# Patient Record
Sex: FEMALE | Race: WHITE | NOT HISPANIC OR LATINO | Employment: STUDENT | URBAN - METROPOLITAN AREA
[De-identification: names, ages, dates, MRNs, and addresses within clinical notes are randomized per-mention and may not be internally consistent; named-entity substitution may affect disease eponyms.]

---
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
## 2019-04-12 ENCOUNTER — CLINICAL SUPPORT (OUTPATIENT)
Dept: FAMILY MEDICINE CLINIC | Facility: CLINIC | Age: 19
End: 2019-04-12
Payer: COMMERCIAL

## 2019-04-12 DIAGNOSIS — Z23 NEED FOR HEPATITIS B VACCINATION: Primary | ICD-10-CM

## 2019-04-12 PROCEDURE — 90744 HEPB VACC 3 DOSE PED/ADOL IM: CPT

## 2019-04-12 PROCEDURE — 90471 IMMUNIZATION ADMIN: CPT

## 2019-06-12 ENCOUNTER — TELEPHONE (OUTPATIENT)
Dept: FAMILY MEDICINE CLINIC | Facility: CLINIC | Age: 19
End: 2019-06-12

## 2019-06-12 DIAGNOSIS — Z01.84 IMMUNITY TO HEPATITIS B VIRUS DEMONSTRATED BY SEROLOGIC TEST: Primary | ICD-10-CM

## 2019-06-17 ENCOUNTER — APPOINTMENT (OUTPATIENT)
Dept: LAB | Facility: MEDICAL CENTER | Age: 19
End: 2019-06-17
Attending: FAMILY MEDICINE
Payer: COMMERCIAL

## 2019-06-17 DIAGNOSIS — Z01.84 IMMUNITY TO HEPATITIS B VIRUS DEMONSTRATED BY SEROLOGIC TEST: ICD-10-CM

## 2019-06-17 PROCEDURE — 86706 HEP B SURFACE ANTIBODY: CPT

## 2019-06-17 PROCEDURE — 36415 COLL VENOUS BLD VENIPUNCTURE: CPT

## 2019-06-18 ENCOUNTER — TELEPHONE (OUTPATIENT)
Dept: OTHER | Facility: OTHER | Age: 19
End: 2019-06-18

## 2019-06-18 LAB — HBV SURFACE AB SER-ACNC: >1000 MIU/ML

## 2019-06-19 ENCOUNTER — OFFICE VISIT (OUTPATIENT)
Dept: FAMILY MEDICINE CLINIC | Facility: CLINIC | Age: 19
End: 2019-06-19
Payer: COMMERCIAL

## 2019-06-19 VITALS
BODY MASS INDEX: 23.21 KG/M2 | WEIGHT: 131 LBS | OXYGEN SATURATION: 97 % | HEIGHT: 63 IN | HEART RATE: 94 BPM | SYSTOLIC BLOOD PRESSURE: 100 MMHG | DIASTOLIC BLOOD PRESSURE: 50 MMHG | TEMPERATURE: 98 F

## 2019-06-19 DIAGNOSIS — R59.0 LYMPHADENOPATHY, AXILLARY: Primary | ICD-10-CM

## 2019-06-19 PROCEDURE — 99213 OFFICE O/P EST LOW 20 MIN: CPT | Performed by: FAMILY MEDICINE

## 2019-06-19 PROCEDURE — 1036F TOBACCO NON-USER: CPT | Performed by: FAMILY MEDICINE

## 2019-06-19 PROCEDURE — 3008F BODY MASS INDEX DOCD: CPT | Performed by: FAMILY MEDICINE

## 2019-06-19 RX ORDER — CEPHALEXIN 500 MG/1
500 CAPSULE ORAL EVERY 8 HOURS SCHEDULED
Qty: 15 CAPSULE | Refills: 0 | Status: SHIPPED | OUTPATIENT
Start: 2019-06-19 | End: 2019-06-24

## 2019-06-19 RX ORDER — MULTIVITAMIN
1 TABLET ORAL DAILY
COMMUNITY
End: 2021-10-15 | Stop reason: ALTCHOICE

## 2019-06-19 RX ORDER — NORETHINDRONE ACETATE AND ETHINYL ESTRADIOL AND FERROUS FUMARATE 1MG-20(21)
1 KIT ORAL DAILY
Refills: 3 | COMMUNITY
Start: 2019-06-07 | End: 2020-08-12 | Stop reason: CLARIF

## 2019-12-19 ENCOUNTER — OFFICE VISIT (OUTPATIENT)
Dept: URGENT CARE | Facility: MEDICAL CENTER | Age: 19
End: 2019-12-19
Payer: COMMERCIAL

## 2019-12-19 VITALS
HEART RATE: 90 BPM | HEIGHT: 63 IN | BODY MASS INDEX: 23.21 KG/M2 | WEIGHT: 131 LBS | RESPIRATION RATE: 16 BRPM | TEMPERATURE: 97.9 F | SYSTOLIC BLOOD PRESSURE: 104 MMHG | DIASTOLIC BLOOD PRESSURE: 64 MMHG | OXYGEN SATURATION: 98 %

## 2019-12-19 DIAGNOSIS — J00 ACUTE NASOPHARYNGITIS: Primary | ICD-10-CM

## 2019-12-19 PROCEDURE — 99203 OFFICE O/P NEW LOW 30 MIN: CPT | Performed by: PHYSICIAN ASSISTANT

## 2019-12-19 RX ORDER — PREDNISONE 20 MG/1
40 TABLET ORAL DAILY
Qty: 10 TABLET | Refills: 0 | Status: SHIPPED | OUTPATIENT
Start: 2019-12-19 | End: 2019-12-24

## 2019-12-19 RX ORDER — CETIRIZINE HYDROCHLORIDE, PSEUDOEPHEDRINE HYDROCHLORIDE 5; 120 MG/1; MG/1
1 TABLET, FILM COATED, EXTENDED RELEASE ORAL 2 TIMES DAILY
Qty: 30 TABLET | Refills: 0 | Status: SHIPPED | OUTPATIENT
Start: 2019-12-19 | End: 2020-06-18 | Stop reason: ALTCHOICE

## 2019-12-19 RX ORDER — BENZONATATE 100 MG/1
100 CAPSULE ORAL 3 TIMES DAILY PRN
Qty: 15 CAPSULE | Refills: 0 | Status: SHIPPED | OUTPATIENT
Start: 2019-12-19 | End: 2020-06-18 | Stop reason: ALTCHOICE

## 2019-12-19 RX ORDER — FLUTICASONE PROPIONATE 50 MCG
2 SPRAY, SUSPENSION (ML) NASAL DAILY
Qty: 16 G | Refills: 0 | Status: SHIPPED | OUTPATIENT
Start: 2019-12-19 | End: 2021-10-15 | Stop reason: ALTCHOICE

## 2019-12-19 NOTE — PATIENT INSTRUCTIONS
take prednisone as directed until completed   Motrin and/or Tylenol as needed for fevers aches and pains   use additional medications as directed for symptomatic relief as needed   drink plenty of fluids and stay well hydrated  Follow up with PCP in 3-5 days  Proceed to  ER if symptoms worsen  Cold Symptoms   AMBULATORY CARE:   Cold symptoms  include sneezing, dry throat, a stuffy nose, headache, watery eyes, and a cough  Your cough may be dry, or you may cough up mucus  You may also have muscle aches, joint pain, and tiredness  Rarely, you may have a fever  Cold symptoms occur from inflammation in your upper respiratory system caused by a virus  Most colds go away without treatment  Seek care immediately if:   · You have increased tiredness and weakness  · You are unable to eat  · Your heart is beating much faster than usual for you  · You see white spots in the back of your throat and your neck is swollen and sore to the touch  · You see pinpoint or larger reddish-purple dots on your skin  Contact your healthcare provider if:   · You have a fever higher than 102°F (38 9°C)  · You have new or worsening shortness of breath  · You have thick nasal drainage for more than 2 days  · Your symptoms do not improve or get worse within 5 days  · You have questions or concerns about your condition or care  Treatment for cold symptoms  may include NSAIDS to decrease muscle aches and fever  Cold medicines may also be given to decrease coughing, nasal stuffiness, sneezing, and a runny nose  Manage your cold symptoms: The following may help relieve cold symptoms, such as a dry throat and congestion:  · Gargle with mouthwash or warm salt water as directed  · Suck on throat lozenges or hard candy  · Use a cold or warm vaporizer or humidifier to ease your breathing  · Rest for at least 2 days and then as needed to decrease tiredness and weakness       · Use petroleum based jelly around your nostrils to decrease irritation from blowing your nose  · Drink plenty of liquids  Liquids will help thin and loosen thick mucus so you can cough it up  Liquids will also keep you hydrated  Ask your healthcare provider which liquids are best for you and how much to drink each day  Prevent the spread of germs  by washing your hands often  You can spread your cold germs to others for at least 3 days after your symptoms start  Do not share items, such as eating utensils  Cover your nose and mouth when you cough or sneeze using the crook of your elbow instead of your hands  Throw used tissues in the garbage  Do not smoke:  Smoking may worsen your symptoms and increase the length of time you feel sick  Talk with your healthcare provider if you need help to stop smoking  Follow up with your healthcare provider as directed:  Write down your questions so you remember to ask them during your visits  © 2017 [employer]  Information is for End User's use only and may not be sold, redistributed or otherwise used for commercial purposes  All illustrations and images included in CareNotes® are the copyrighted property of [employer]  or [employer]  The above information is an  only  It is not intended as medical advice for individual conditions or treatments  Talk to your doctor, nurse or pharmacist before following any medical regimen to see if it is safe and effective for you

## 2019-12-19 NOTE — PROGRESS NOTES
[facility]        NAME: Kylah Butts is a 20 y o  female  : 2000    MRN: [id number]  DATE: 2019  TIME: 1:19 PM    Assessment and Plan   Acute nasopharyngitis [J00]  1  Acute nasopharyngitis  fluticasone (FLONASE) 50 mcg/act nasal spray    cetirizine-pseudoephedrine (ZyrTEC-D) 5-120 MG per tablet    benzonatate (TESSALON PERLES) 100 mg capsule    predniSONE 20 mg tablet         Patient Instructions      take prednisone as directed until completed   Motrin and/or Tylenol as needed for fevers aches and pains   use additional medications as directed for symptomatic relief as needed   drink plenty of fluids and stay well hydrated  Follow up with PCP in 3-5 days  Proceed to  ER if symptoms worsen  Chief Complaint     Chief Complaint   Patient presents with    Cold Like Symptoms     Patient relates started 4x days ago with cough and sinus congestion  History of Present Illness       42-year-old female presents with runny nose cough congestion sore throat that started 4 days ago  Patient is concerned because 1 of her friends took a sip from her drink and there diagnosed with pneumonia however they very been on antibiotics greater than 24 hours  Denies any fevers or chills  No chest pain shortness of breath  Denies any productive cough  No abdominal pain nausea vomiting or diarrhea  Denies any blurry vision or rashes  Cough   This is a new problem  The current episode started in the past 7 days  The problem has been waxing and waning  The problem occurs constantly  The cough is non-productive  Associated symptoms include nasal congestion, postnasal drip, rhinorrhea and a sore throat  Pertinent negatives include no chills, fever, shortness of breath or wheezing  Nothing aggravates the symptoms  She has tried nothing for the symptoms  The treatment provided no relief  There is no history of asthma  Review of Systems   Review of Systems   Constitutional: Negative  Negative for chills and fever  HENT: Positive for postnasal drip, rhinorrhea and sore throat  Eyes: Negative  Respiratory: Positive for cough  Negative for shortness of breath and wheezing  Cardiovascular: Negative  Gastrointestinal: Negative  Musculoskeletal: Negative  Skin: Negative  Neurological: Negative  Current Medications       Current Outpatient Medications:     JUNEL FE 1/20 1-20 MG-MCG per tablet, Take 1 tablet by mouth daily, Disp: , Rfl: 3    Multiple Vitamin (MULTIVITAMIN) tablet, Take 1 tablet by mouth daily, Disp: , Rfl:     ACZONE 7 5 % GEL, Apply 1 application topically 2 (two) times a day (Patient not taking: Reported on 12/19/2019), Disp: 90 g, Rfl: 1    benzonatate (TESSALON PERLES) 100 mg capsule, Take 1 capsule (100 mg total) by mouth 3 (three) times a day as needed for cough, Disp: 15 capsule, Rfl: 0    cetirizine-pseudoephedrine (ZyrTEC-D) 5-120 MG per tablet, Take 1 tablet by mouth 2 (two) times a day, Disp: 30 tablet, Rfl: 0    EPIDUO FORTE 0 3-2 5 % GEL, Apply 1 application topically 2 (two) times a day (Patient not taking: Reported on 12/19/2019), Disp: 60 g, Rfl: 1    fluticasone (FLONASE) 50 mcg/act nasal spray, 2 sprays into each nostril daily, Disp: 16 g, Rfl: 0    predniSONE 20 mg tablet, Take 2 tablets (40 mg total) by mouth daily for 5 days, Disp: 10 tablet, Rfl: 0    Current Allergies     Allergies as of 12/19/2019 - Reviewed 12/19/2019   Allergen Reaction Noted    Amitriptyline Vomiting 08/11/2015            The following portions of the patient's history were reviewed and updated as appropriate: allergies, current medications, past family history, past medical history, past social history, past surgical history and problem list      Past Medical History:   Diagnosis Date    Changing nevus     last assessed: 4/2/2015    Depression     last assessed: 2/25/2015       History reviewed  No pertinent surgical history      Family History Problem Relation Age of Onset    Diabetes type II Maternal Grandmother         mellitus    Hypothyroidism Maternal Grandmother         hypothyroid    Scleroderma Maternal Grandmother     Hyperlipidemia Paternal Grandmother     Diabetes type II Paternal Grandmother     Anxiety disorder Mother         anxiety and depression    Depression Mother         anxiety and depression    Bipolar disorder Father     Hypothyroidism Sister         hypothyroid    Anxiety disorder Brother         anxiety and depression    Depression Brother         anxiety and depression    Multiple sclerosis Paternal Grandfather          Medications have been verified  Objective   /64   Pulse 90   Temp 97 9 °F (36 6 °C) (Tympanic)   Resp 16   Ht 5' 2 8" (1 595 m)   Wt 59 4 kg (131 lb)   LMP 12/08/2019   SpO2 98%   BMI 23 35 kg/m²        Physical Exam     Physical Exam   Constitutional: She is oriented to person, place, and time  She appears well-developed and well-nourished  No distress  HENT:   Head: Normocephalic and atraumatic  Right Ear: Hearing, tympanic membrane, external ear and ear canal normal    Left Ear: Hearing, tympanic membrane, external ear and ear canal normal    Nose: Nose normal    Mouth/Throat: Uvula is midline, oropharynx is clear and moist and mucous membranes are normal  No oropharyngeal exudate  Eyes: Conjunctivae and EOM are normal  Right eye exhibits no discharge  Left eye exhibits no discharge  Neck: Normal range of motion  Neck supple  Cardiovascular: Normal rate, regular rhythm, normal heart sounds and intact distal pulses  No murmur heard  Pulmonary/Chest: Effort normal and breath sounds normal  No respiratory distress  She has no wheezes  She has no rales  Abdominal: Soft  Bowel sounds are normal  There is no tenderness  Musculoskeletal: Normal range of motion  Lymphadenopathy:     She has no cervical adenopathy     Neurological: She is alert and oriented to person, place, and time  Skin: Skin is warm and dry  Psychiatric: She has a normal mood and affect  Nursing note and vitals reviewed

## 2019-12-30 DIAGNOSIS — L70.0 ACNE VULGARIS: ICD-10-CM

## 2019-12-30 RX ORDER — ADAPALENE AND BENZOYL PEROXIDE 3; 25 MG/G; MG/G
1 GEL TOPICAL 2 TIMES DAILY
Qty: 60 G | Refills: 1 | Status: SHIPPED | OUTPATIENT
Start: 2019-12-30 | End: 2021-10-15 | Stop reason: ALTCHOICE

## 2020-01-31 NOTE — RESULT NOTES
[facility] Care Coordination Contact    CC called and spoke with adult daughter Sergio regarding completion of AVS form. She will need to have the form complete by end of February to avoid disruption in [place] benefit. She can request the form from [facility] if she unable to locate it. CC offered annual visit for assessment. Daughter stated she wants to complete the form first and will reach out to CC when she is ready to schedule.     Dileep Jorge RN Care Coordinator  [facility]  Office: [phone]  Fax: [phone]     Verified Results  (1) CBC/PLT/DIFF [id number] 04:59PM Leslye Cost    Order Number: [id number]     Test Name Result Flag Reference   WBC COUNT 6 05 Thousand/uL  4 31-10 16   RBC COUNT 4 88 Million/uL  3 81-5 12   HEMOGLOBIN 14 8 g/dL  11 5-15 4   HEMATOCRIT 43 7 %  34 8-46  1   MCV 90 fL  82-98   MCH 30 3 pg  26 8-34 3   MCHC 33 9 g/dL  31 4-37 4   RDW 12 7 %  11 6-15 1   MPV 10 5 fL  8 9-12 7   PLATELET COUNT 334 Thousands/uL  149-390   nRBC AUTOMATED 0 /100 WBCs     NEUTROPHILS RELATIVE PERCENT 47 %  43-75   LYMPHOCYTES RELATIVE PERCENT 43 %  14-44   MONOCYTES RELATIVE PERCENT 9 %  4-12   EOSINOPHILS RELATIVE PERCENT 1 %  0-6   BASOPHILS RELATIVE PERCENT 0 %  0-1   NEUTROPHILS ABSOLUTE COUNT 2 84 Thousands/? ??L  1 85-7 62   LYMPHOCYTES ABSOLUTE COUNT 2 59 Thousands/? ??L  0 60-4 47   MONOCYTES ABSOLUTE COUNT 0 53 Thousand/? ??L  0 17-1 22   EOSINOPHILS ABSOLUTE COUNT 0 06 Thousand/? ??L  0 00-0 61   BASOPHILS ABSOLUTE COUNT 0 02 Thousands/? ??L  0 00-0 10     (1) COMPREHENSIVE METABOLIC PANEL [id number] 20:76BW Leslye Cost    Order Number: [id number]     Test Name Result Flag Reference   GLUCOSE,RANDM 82 mg/dL     If the patient is fasting, the ADA then defines impaired fasting glucose as > 100 mg/dL and diabetes as > or equal to 123 mg/dL  SODIUM 138 mmol/L  136-145   POTASSIUM 4 0 mmol/L  3 5-5 3   CHLORIDE 104 mmol/L  100-108   CARBON DIOXIDE 26 mmol/L  21-32   ANION GAP (CALC) 8 mmol/L  4-13   BLOOD UREA NITROGEN 9 mg/dL  5-25   CREATININE 0 70 mg/dL  0 60-1 30   Standardized to IDMS reference method   CALCIUM 9 5 mg/dL  8 3-10 1   BILI, TOTAL 0 44 mg/dL  0 20-1 00   ALK PHOSPHATAS 72 U/L     ALT (SGPT) 16 U/L  12-78   AST(SGOT) 13 U/L  5-45   ALBUMIN 3 9 g/dL  3 5-5 0   TOTAL PROTEIN 7 8 g/dL  6 4-8 2   eGFR      eGFR calculation is only valid for adults 18 years and older  ml/min/1 73sq m   eGFR calculation is only valid for adults 18 years and older       (1) TSH WITH FT4 REFLEX 03Aug2017 04:59PM Curt Polanco    Order Number: [id number]     Test Name Result Flag Reference   TSH 1 100 uIU/mL  0 463-3 980   Patients undergoing fluorescein dye angiography may retain small amounts of fluorescein in the body for 48-72 hours post procedure  Samples containing fluorescein can produce falsely depressed TSH values  If the patient had this procedure,a specimen should be resubmitted post fluorescein clearance  The recommended reference ranges for TSH during pregnancy are as follows:  First trimester 0 1 to 2 5 uIU/mL  Second trimester  0 2 to 3 0 uIU/mL  Third trimester 0 3 to 3 0 uIU/m     (1) VITAMIN D 25-HYDROXY [id number] 04:59PM Curt Polanco    Order Number: [id number]     Test Name Result Flag Reference   VIT D 25-HYDROX 40 0 ng/mL  30 0-100 0   This assay is a certified procedure of the CDC Vitamin D Standardization Certification Program (VDSCP)     Deficiency <20ng/ml   Insufficiency 20-30ng/ml   Sufficient  ng/ml     *Patients undergoing fluorescein dye angiography may retain small amounts of fluorescein in the body for 48-72 hours post procedure  Samples containing fluorescein can produce falsely elevated Vitamin D values  If the patient had this procedure, a specimen should be resubmitted post fluorescein clearance

## 2020-03-25 ENCOUNTER — TELEMEDICINE (OUTPATIENT)
Dept: FAMILY MEDICINE CLINIC | Facility: CLINIC | Age: 20
End: 2020-03-25
Payer: COMMERCIAL

## 2020-03-25 DIAGNOSIS — K52.9 GASTROENTERITIS: Primary | ICD-10-CM

## 2020-03-25 PROCEDURE — 99213 OFFICE O/P EST LOW 20 MIN: CPT | Performed by: FAMILY MEDICINE

## 2020-03-25 RX ORDER — ONDANSETRON 4 MG/1
4 TABLET, ORALLY DISINTEGRATING ORAL EVERY 6 HOURS PRN
Qty: 10 TABLET | Refills: 0 | Status: SHIPPED | OUTPATIENT
Start: 2020-03-25 | End: 2020-06-18 | Stop reason: ALTCHOICE

## 2020-03-25 NOTE — PROGRESS NOTES
Virtual Regular Visit    Problem List Items Addressed This Visit     None      Visit Diagnoses     Gastroenteritis    -  Primary      Symptomatic treatment with brat diet and avoidance of dairy products except she may have yogurt  Called in Zofran for nausea  Call back if not resolving  Relevant Medications    ondansetron (ZOFRAN-ODT) 4 mg disintegrating tablet               Reason for visit is GI    Encounter provider Renita Vela MD    Provider located at [address] [place] [address] Dr [place]agon [id number]      Recent Visits  No visits were found meeting these conditions  Showing recent visits within past 7 days and meeting all other requirements     Future Appointments  No visits were found meeting these conditions  Showing future appointments within next 150 days and meeting all other requirements        After connecting through [employer], the patient was identified by name and date of birth  Big Laurel Zhu was informed that this is a telemedicine visit and that the visit is being conducted through [employer] and patient was informed that this is not a secure, HIPAA-complaint platform  she agrees to proceed  which may not be secure and therefore, might not be HIPAA-compliant  My office door was closed  No one else was in the room  She acknowledged consent and understanding of privacy and security of the video platform  The patient has agreed to participate and understands they can discontinue the visit at any time  Rocael Reynolds is a 20 y o  female   She complains of GI symptoms for the past 4 days  She woke this past Sunday and felt nauseous  Then she had several episodes of vomiting throughout the day  The nausea has continued but vomiting has stopped  Yesterday she had some diarrhea as well  She feels very hungry but is unable to eat because the nausea  She has some mild abdominal pain on the left side    She denies risk of pregnancy  She takes birth control pills and LMP was February 28    she denies recent travel  She denies risk of corona virus exposure  She denies fever, chills or cough  Past Medical History:   Diagnosis Date    Changing nevus     last assessed: 4/2/2015    Depression     last assessed: 2/25/2015       No past surgical history on file  Current Outpatient Medications   Medication Sig Dispense Refill    ACZONE 7 5 % GEL Apply 1 application topically 2 (two) times a day (Patient not taking: Reported on 12/19/2019) 90 g 1    benzonatate (TESSALON PERLES) 100 mg capsule Take 1 capsule (100 mg total) by mouth 3 (three) times a day as needed for cough 15 capsule 0    cetirizine-pseudoephedrine (ZyrTEC-D) 5-120 MG per tablet Take 1 tablet by mouth 2 (two) times a day 30 tablet 0    EPIDUO FORTE 0 3-2 5 % GEL Apply 1 application topically 2 (two) times a day 60 g 1    fluticasone (FLONASE) 50 mcg/act nasal spray 2 sprays into each nostril daily 16 g 0    JUNEL FE 1/20 1-20 MG-MCG per tablet Take 1 tablet by mouth daily  3    Multiple Vitamin (MULTIVITAMIN) tablet Take 1 tablet by mouth daily      ondansetron (ZOFRAN-ODT) 4 mg disintegrating tablet Take 1 tablet (4 mg total) by mouth every 6 (six) hours as needed for nausea or vomiting 10 tablet 0     No current facility-administered medications for this visit  Allergies   Allergen Reactions    Amitriptyline Vomiting     Category: Adverse Reaction; Review of Systems   Constitutional: Positive for activity change  Negative for chills and fever  HENT: Negative for congestion  Respiratory: Negative for cough  Gastrointestinal: Positive for abdominal pain, nausea and vomiting  Negative for abdominal distention  Genitourinary: Negative for difficulty urinating  Neurological: Negative for dizziness and headaches  Physical Exam   Constitutional: She appears well-developed and well-nourished     HENT:   Head: Normocephalic and atraumatic  Abdominal: There is tenderness  She is pointing to left side of the abdomen periumbilical region  Psychiatric: She has a normal mood and affect  Her behavior is normal         I spent 13 minutes with the patient during this visit  This would be [id number] office visit

## 2020-06-18 ENCOUNTER — OFFICE VISIT (OUTPATIENT)
Dept: FAMILY MEDICINE CLINIC | Facility: CLINIC | Age: 20
End: 2020-06-18
Payer: COMMERCIAL

## 2020-06-18 VITALS
DIASTOLIC BLOOD PRESSURE: 60 MMHG | SYSTOLIC BLOOD PRESSURE: 110 MMHG | RESPIRATION RATE: 18 BRPM | HEART RATE: 80 BPM | HEIGHT: 62 IN | BODY MASS INDEX: 21.16 KG/M2 | TEMPERATURE: 98.6 F | WEIGHT: 115 LBS | OXYGEN SATURATION: 100 %

## 2020-06-18 DIAGNOSIS — R11.0 CHRONIC NAUSEA: ICD-10-CM

## 2020-06-18 DIAGNOSIS — Z00.00 ANNUAL PHYSICAL EXAM: Primary | ICD-10-CM

## 2020-06-18 DIAGNOSIS — Z11.4 SCREENING FOR HIV (HUMAN IMMUNODEFICIENCY VIRUS): ICD-10-CM

## 2020-06-18 DIAGNOSIS — R53.83 FATIGUE, UNSPECIFIED TYPE: ICD-10-CM

## 2020-06-18 DIAGNOSIS — Z13.220 SCREENING FOR HYPERLIPIDEMIA: ICD-10-CM

## 2020-06-18 PROBLEM — J02.9 PHARYNGITIS: Status: RESOLVED | Noted: 2018-10-31 | Resolved: 2020-06-18

## 2020-06-18 PROCEDURE — 99395 PREV VISIT EST AGE 18-39: CPT | Performed by: FAMILY MEDICINE

## 2020-06-18 RX ORDER — TRIAMCINOLONE ACETONIDE 1 MG/G
CREAM TOPICAL
COMMUNITY
Start: 2020-05-28 | End: 2021-10-15 | Stop reason: ALTCHOICE

## 2020-06-19 ENCOUNTER — APPOINTMENT (OUTPATIENT)
Dept: LAB | Facility: MEDICAL CENTER | Age: 20
End: 2020-06-19
Attending: FAMILY MEDICINE
Payer: COMMERCIAL

## 2020-06-19 ENCOUNTER — CLINICAL SUPPORT (OUTPATIENT)
Dept: FAMILY MEDICINE CLINIC | Facility: CLINIC | Age: 20
End: 2020-06-19
Payer: COMMERCIAL

## 2020-06-19 VITALS — TEMPERATURE: 99.4 F

## 2020-06-19 DIAGNOSIS — Z11.4 SCREENING FOR HIV (HUMAN IMMUNODEFICIENCY VIRUS): ICD-10-CM

## 2020-06-19 DIAGNOSIS — Z11.1 ENCOUNTER FOR TUBERCULIN SKIN TEST: Primary | ICD-10-CM

## 2020-06-19 DIAGNOSIS — Z11.1 PPD SCREENING TEST: ICD-10-CM

## 2020-06-19 DIAGNOSIS — R53.83 FATIGUE, UNSPECIFIED TYPE: ICD-10-CM

## 2020-06-19 DIAGNOSIS — R11.0 CHRONIC NAUSEA: ICD-10-CM

## 2020-06-19 DIAGNOSIS — Z13.220 SCREENING FOR HYPERLIPIDEMIA: ICD-10-CM

## 2020-06-19 LAB
ALBUMIN SERPL BCP-MCNC: 3.9 G/DL (ref 3.5–5)
ALP SERPL-CCNC: 56 U/L (ref 46–116)
ALT SERPL W P-5'-P-CCNC: 15 U/L (ref 12–78)
ANION GAP SERPL CALCULATED.3IONS-SCNC: 8 MMOL/L (ref 4–13)
AST SERPL W P-5'-P-CCNC: 12 U/L (ref 5–45)
BASOPHILS # BLD AUTO: 0.04 THOUSANDS/ΜL (ref 0–0.1)
BASOPHILS NFR BLD AUTO: 1 % (ref 0–1)
BILIRUB SERPL-MCNC: 0.48 MG/DL (ref 0.2–1)
BUN SERPL-MCNC: 8 MG/DL (ref 5–25)
CALCIUM SERPL-MCNC: 9.2 MG/DL (ref 8.3–10.1)
CHLORIDE SERPL-SCNC: 106 MMOL/L (ref 100–108)
CHOLEST SERPL-MCNC: 206 MG/DL (ref 50–200)
CO2 SERPL-SCNC: 23 MMOL/L (ref 21–32)
CREAT SERPL-MCNC: 0.8 MG/DL (ref 0.6–1.3)
EOSINOPHIL # BLD AUTO: 0.07 THOUSAND/ΜL (ref 0–0.61)
EOSINOPHIL NFR BLD AUTO: 1 % (ref 0–6)
ERYTHROCYTE [DISTWIDTH] IN BLOOD BY AUTOMATED COUNT: 12.9 % (ref 11.6–15.1)
GFR SERPL CREATININE-BSD FRML MDRD: 106 ML/MIN/1.73SQ M
GLUCOSE P FAST SERPL-MCNC: 84 MG/DL (ref 65–99)
HCT VFR BLD AUTO: 43.1 % (ref 34.8–46.1)
HDLC SERPL-MCNC: 58 MG/DL
HGB BLD-MCNC: 14 G/DL (ref 11.5–15.4)
IMM GRANULOCYTES # BLD AUTO: 0.02 THOUSAND/UL (ref 0–0.2)
IMM GRANULOCYTES NFR BLD AUTO: 0 % (ref 0–2)
LDLC SERPL CALC-MCNC: 123 MG/DL (ref 0–100)
LYMPHOCYTES # BLD AUTO: 2.96 THOUSANDS/ΜL (ref 0.6–4.47)
LYMPHOCYTES NFR BLD AUTO: 50 % (ref 14–44)
MCH RBC QN AUTO: 30.4 PG (ref 26.8–34.3)
MCHC RBC AUTO-ENTMCNC: 32.5 G/DL (ref 31.4–37.4)
MCV RBC AUTO: 94 FL (ref 82–98)
MONOCYTES # BLD AUTO: 0.52 THOUSAND/ΜL (ref 0.17–1.22)
MONOCYTES NFR BLD AUTO: 9 % (ref 4–12)
NEUTROPHILS # BLD AUTO: 2.26 THOUSANDS/ΜL (ref 1.85–7.62)
NEUTS SEG NFR BLD AUTO: 39 % (ref 43–75)
NONHDLC SERPL-MCNC: 148 MG/DL
NRBC BLD AUTO-RTO: 0 /100 WBCS
PLATELET # BLD AUTO: 279 THOUSANDS/UL (ref 149–390)
PMV BLD AUTO: 10.9 FL (ref 8.9–12.7)
POTASSIUM SERPL-SCNC: 3.8 MMOL/L (ref 3.5–5.3)
PROT SERPL-MCNC: 7.3 G/DL (ref 6.4–8.2)
RBC # BLD AUTO: 4.61 MILLION/UL (ref 3.81–5.12)
SODIUM SERPL-SCNC: 137 MMOL/L (ref 136–145)
TRIGL SERPL-MCNC: 125 MG/DL
TSH SERPL DL<=0.05 MIU/L-ACNC: 1.49 UIU/ML (ref 0.46–3.98)
WBC # BLD AUTO: 5.87 THOUSAND/UL (ref 4.31–10.16)

## 2020-06-19 PROCEDURE — 86580 TB INTRADERMAL TEST: CPT

## 2020-06-19 PROCEDURE — 36415 COLL VENOUS BLD VENIPUNCTURE: CPT

## 2020-06-19 PROCEDURE — 85025 COMPLETE CBC W/AUTO DIFF WBC: CPT

## 2020-06-19 PROCEDURE — 80061 LIPID PANEL: CPT

## 2020-06-19 PROCEDURE — 80053 COMPREHEN METABOLIC PANEL: CPT

## 2020-06-19 PROCEDURE — 84443 ASSAY THYROID STIM HORMONE: CPT

## 2020-06-19 PROCEDURE — 87389 HIV-1 AG W/HIV-1&-2 AB AG IA: CPT

## 2020-06-22 ENCOUNTER — CLINICAL SUPPORT (OUTPATIENT)
Dept: FAMILY MEDICINE CLINIC | Facility: CLINIC | Age: 20
End: 2020-06-22

## 2020-06-22 DIAGNOSIS — Z11.1 ENCOUNTER FOR PPD SKIN TEST READING: Primary | ICD-10-CM

## 2020-06-22 LAB
HIV 1+2 AB+HIV1 P24 AG SERPL QL IA: NORMAL
INDURATION: 0 MM
TB SKIN TEST: NEGATIVE

## 2020-08-12 ENCOUNTER — OFFICE VISIT (OUTPATIENT)
Dept: FAMILY MEDICINE CLINIC | Facility: CLINIC | Age: 20
End: 2020-08-12
Payer: COMMERCIAL

## 2020-08-12 VITALS
OXYGEN SATURATION: 99 % | HEIGHT: 62 IN | WEIGHT: 114.4 LBS | SYSTOLIC BLOOD PRESSURE: 98 MMHG | HEART RATE: 79 BPM | BODY MASS INDEX: 21.05 KG/M2 | DIASTOLIC BLOOD PRESSURE: 64 MMHG | RESPIRATION RATE: 16 BRPM | TEMPERATURE: 98.2 F

## 2020-08-12 DIAGNOSIS — R21 RASH OF GENITAL AREA: Primary | ICD-10-CM

## 2020-08-12 PROCEDURE — 1036F TOBACCO NON-USER: CPT | Performed by: FAMILY MEDICINE

## 2020-08-12 PROCEDURE — 3008F BODY MASS INDEX DOCD: CPT | Performed by: FAMILY MEDICINE

## 2020-08-12 PROCEDURE — 99214 OFFICE O/P EST MOD 30 MIN: CPT | Performed by: FAMILY MEDICINE

## 2020-08-12 RX ORDER — CLOBETASOL PROPIONATE 0.5 MG/G
0.05 CREAM TOPICAL AS NEEDED
COMMUNITY
End: 2021-10-15 | Stop reason: ALTCHOICE

## 2020-08-12 RX ORDER — NORETHINDRONE ACETATE AND ETHINYL ESTRADIOL 1MG-20(21)
KIT ORAL
COMMUNITY
End: 2022-01-03 | Stop reason: SDUPTHER

## 2020-08-12 NOTE — PROGRESS NOTES
Assessment/Plan:        Rash of genital area  Patient was given new diagnosis of possible lichen sclerosis by her gynecologist yesterday  She and her mom are quite upset because of indications for autoimmune disease, especially since there is a family history of autoimmune disease in her grandmother  I have given her a referral to dermatology as well as rheumatology for further evaluation  I recommended that she should use the Temovate cream to help her symptoms  Diagnoses and all orders for this visit:    Rash of genital area  -     Ambulatory referral to Dermatology; Future  -     Ambulatory referral to Rheumatology; Future    Other orders  -     norethindrone-ethinyl estradiol (Junel FE 1/20) 1-20 MG-MCG per tablet; Junel FE 1/20 (28) 1 mg-20 mcg (21)/75 mg (7) tablet  -     clobetasol (Temovate) 0 05 % cream; 0 05 % by Alternating Nares route as needed          Subjective:      Patient ID: Opal Murphy is a 20 y o  female  She is here with her mom with c/o vaginal pain and itching for over 6 months  She has been on Junel birth control pills and gets periods on a regular basis  She is sexually active with 1 partner  No dyspareunia  She gets random sharp pains in the vaginal region and perineum  These are always worse with menses  She also gets itching which has been helped with steroid cream   She saw Gynecologist in May at [address] [place] [address]  She was given cream which gave a little relief for itching but did not take rash or pain away  Symptoms worsened and she went to gynecologist again yesterday and was diagnosed possible lichen sclerosis and was told this could be autoimmune  She was given prescription for Temovate cream but has not picked it up yet  Her grandmother has scleroderma and possible lichen sclerosis    When rash is present she has dysuria          The following portions of the patient's history were reviewed and updated as appropriate: allergies, current medications, past family history, past medical history, past social history, past surgical history and problem list     Review of Systems   Constitutional: Negative for activity change, chills and fever  HENT: Negative for congestion  Respiratory: Negative for cough  Cardiovascular: Negative for chest pain  Gastrointestinal: Negative for abdominal pain, diarrhea, nausea and vomiting  Genitourinary: Positive for dysuria  Musculoskeletal: Negative for arthralgias and myalgias  Skin: Positive for rash  Neurological: Negative for dizziness and headaches  Hematological: Negative for adenopathy  Objective:      BP 98/64 (BP Location: Left arm, Patient Position: Sitting, Cuff Size: Adult)   Pulse 79   Temp 98 2 °F (36 8 °C) (Temporal)   Resp 16   Ht 5' 2" (1 575 m)   Wt 51 9 kg (114 lb 6 4 oz)   LMP 07/28/2020   SpO2 99%   BMI 20 92 kg/m²          Physical Exam  Vitals signs and nursing note reviewed  Exam conducted with a chaperone present  Constitutional:       Appearance: Normal appearance  HENT:      Head: Normocephalic and atraumatic  Cardiovascular:      Rate and Rhythm: Normal rate and regular rhythm  Pulmonary:      Effort: Pulmonary effort is normal       Breath sounds: Normal breath sounds  Genitourinary:     Comments: Vulval with mild erythema but no rash  No vaginal discharge  Neurological:      Mental Status: She is alert  I have spent 28 minutes with Family today in which greater than 50% of this time was spent in counseling/coordination of care regarding Intructions for management

## 2020-08-12 NOTE — ASSESSMENT & PLAN NOTE
Patient was given new diagnosis of possible lichen sclerosis by her gynecologist yesterday  She and her mom are quite upset because of indications for autoimmune disease, especially since there is a family history of autoimmune disease in her grandmother  I have given her a referral to dermatology as well as rheumatology for further evaluation  I recommended that she should use the Temovate cream to help her symptoms

## 2020-08-17 ENCOUNTER — APPOINTMENT (OUTPATIENT)
Dept: LAB | Facility: MEDICAL CENTER | Age: 20
End: 2020-08-17
Payer: COMMERCIAL

## 2020-08-17 ENCOUNTER — OFFICE VISIT (OUTPATIENT)
Dept: RHEUMATOLOGY | Facility: CLINIC | Age: 20
End: 2020-08-17
Payer: COMMERCIAL

## 2020-08-17 VITALS
WEIGHT: 114 LBS | BODY MASS INDEX: 20.98 KG/M2 | DIASTOLIC BLOOD PRESSURE: 70 MMHG | SYSTOLIC BLOOD PRESSURE: 110 MMHG | HEIGHT: 62 IN | TEMPERATURE: 98.8 F

## 2020-08-17 DIAGNOSIS — R53.83 FATIGUE, UNSPECIFIED TYPE: ICD-10-CM

## 2020-08-17 DIAGNOSIS — R21 RASH OF GENITAL AREA: Primary | ICD-10-CM

## 2020-08-17 LAB
CRP SERPL QL: <3 MG/L
ERYTHROCYTE [SEDIMENTATION RATE] IN BLOOD: 13 MM/HOUR (ref 0–19)

## 2020-08-17 PROCEDURE — 99204 OFFICE O/P NEW MOD 45 MIN: CPT | Performed by: INTERNAL MEDICINE

## 2020-08-17 PROCEDURE — 85652 RBC SED RATE AUTOMATED: CPT | Performed by: INTERNAL MEDICINE

## 2020-08-17 PROCEDURE — 86225 DNA ANTIBODY NATIVE: CPT | Performed by: INTERNAL MEDICINE

## 2020-08-17 PROCEDURE — 3008F BODY MASS INDEX DOCD: CPT | Performed by: FAMILY MEDICINE

## 2020-08-17 PROCEDURE — 36415 COLL VENOUS BLD VENIPUNCTURE: CPT | Performed by: INTERNAL MEDICINE

## 2020-08-17 PROCEDURE — 86038 ANTINUCLEAR ANTIBODIES: CPT | Performed by: INTERNAL MEDICINE

## 2020-08-17 PROCEDURE — 86140 C-REACTIVE PROTEIN: CPT | Performed by: INTERNAL MEDICINE

## 2020-08-17 NOTE — PROGRESS NOTES
Assessment and Plan: Trevor Barnard is a 23 y o   female who presents as a Rheumatology consult referred by Angela Sims MD for evaluation of genital lesions that have been itchy and painful  NIESHA and anti-dsDNA ordered to work-up for lupus and returned negative; ESR/CRP ordered to work-up for any underlying inflammation, which could be seen in Behcet's disease for example, but they returned normal  It does not seem that the patient has a rheumatological disease, but asked patient that in order to get a definite answer about her lesions, she should get a skin biopsy by Dermatology  Asked her to try the clobetasol cream that she was recently prescribed nightly for 6-12 weeks to optimally treat her likely lichen sclerosis  Plan:  Diagnoses and all orders for this visit:    Rash of genital area  -     Ambulatory referral to Rheumatology  -     NIESHA Screen w/ Reflex to Titer/Pattern  -     C-reactive protein  -     Sedimentation rate, automated  -     Anti-DNA antibody, double-stranded  -     clobetasol (TEMOVATE) 0 05 % ointment; Apply topically 2 (two) times a day (Patient not taking: Reported on 11/25/2020)    Fatigue, unspecified type    Follow-up plan: Return to clinic as needed      HPI  Trevor Barnard is a 23 y o   female who presents as a Rheumatology consult referred by Angela Sims MD for evaluation of genital lesions that have been itchy and painful  She went to a gynecologist from May 2020-August 2020, and used triamcinolone ointment morning and night, which took away her itchiness but not pain  She went to another gynecologist last Wednesday and was diagnosed with lichen sclerosis; was prescribed clobetasol cream but didn't start it yet  She went to outside dermatology: [facility], and got treatment for yeast infection, which hasn't helped  She gets a lichen sclerosis flare-up 3-5 times a week   No rashes, not photosensitivity, hands get cold, feet always cold and change colors, no dry symptoms, no skin tightening, gets cankre sores while on period, no alopecia, no SOB/chest pain, no h/o pericarditis or pleurisy, no h/o blood clots or miscarriages  Review of Systems  Review of Systems   Constitutional: Negative for chills, fatigue, fever and unexpected weight change  HENT: Negative for mouth sores and trouble swallowing  Eyes: Negative for pain and visual disturbance  Respiratory: Negative for cough and shortness of breath  Cardiovascular: Negative for chest pain and leg swelling  Gastrointestinal: Negative for abdominal pain, blood in stool, constipation, diarrhea and nausea  Genitourinary: Positive for dysuria  Musculoskeletal: Negative for arthralgias, back pain, joint swelling and myalgias  Skin: Positive for rash  Negative for color change  Neurological: Negative for weakness and numbness  Hematological: Negative for adenopathy  Psychiatric/Behavioral: Negative for sleep disturbance  Allergies  Allergies   Allergen Reactions    Amitriptyline Vomiting     Category:  Adverse Reaction;        Home Medications    Current Outpatient Medications:     clobetasol (Temovate) 0 05 % cream, 0 05 % by Alternating Nares route as needed, Disp: , Rfl:     EPIDUO FORTE 0 3-2 5 % GEL, Apply 1 application topically 2 (two) times a day, Disp: 60 g, Rfl: 1    fluticasone (FLONASE) 50 mcg/act nasal spray, 2 sprays into each nostril daily (Patient not taking: Reported on 11/25/2020), Disp: 16 g, Rfl: 0    Multiple Vitamin (MULTIVITAMIN) tablet, Take 1 tablet by mouth daily, Disp: , Rfl:     norethindrone-ethinyl estradiol (Junel FE 1/20) 1-20 MG-MCG per tablet, Junel FE 1/20 (28) 1 mg-20 mcg (21)/75 mg (7) tablet, Disp: , Rfl:     triamcinolone (KENALOG) 0 1 % cream, APPLY THIN COAT TO AFFECTED AREA TWICE A DAY, Disp: , Rfl:     clobetasol (TEMOVATE) 0 05 % ointment, Apply topically 2 (two) times a day (Patient not taking: Reported on 11/25/2020), Disp: 60 g, Rfl: 3    famotidine (PEPCID) 20 mg tablet, Take 1 tablet (20 mg total) by mouth daily, Disp: 30 tablet, Rfl: 2    Past Medical History  Past Medical History:   Diagnosis Date    Changing nevus     last assessed: 4/2/2015    Depression     last assessed: 2/25/2015   maternal grandmother - scleroderma, Raynaud's syndrome, fibromyalgia  Sister - Hashimoto's thyroiditis  Paternal grandfather - multiple sclerosis    Past Surgical History   History reviewed  No pertinent surgical history  Family History    Family History   Problem Relation Age of Onset    Diabetes type II Maternal Grandmother         mellitus    Hypothyroidism Maternal Grandmother         hypothyroid    Scleroderma Maternal Grandmother     Hyperlipidemia Paternal Grandmother     Diabetes type II Paternal Grandmother     Anxiety disorder Mother         anxiety and depression    Depression Mother         anxiety and depression    Bipolar disorder Father     Hypothyroidism Sister         hypothyroid    Anxiety disorder Brother         anxiety and depression    Depression Brother         anxiety and depression    Multiple sclerosis Paternal Grandfather      No known family history of autoimmune or inflammatory diseases  Social History  Social History     Substance and Sexual Activity   Alcohol Use No     Social History     Substance and Sexual Activity   Drug Use No     Social History     Tobacco Use   Smoking Status Never Smoker   Smokeless Tobacco Never Used       Objective:  Vitals:    08/17/20 1312   BP: 110/70   BP Location: Right arm   Patient Position: Sitting   Cuff Size: Standard   Temp: 98 8 °F (37 1 °C)   TempSrc: Temporal   Weight: 51 7 kg (114 lb)   Height: 5' 2" (1 575 m)       Physical Exam  Constitutional:       General: She is not in acute distress  Appearance: She is well-developed  HENT:      Head: Normocephalic and atraumatic  Eyes:      General: Lids are normal  No scleral icterus       Conjunctiva/sclera: Conjunctivae normal    Neck:      Musculoskeletal: Neck supple  No muscular tenderness  Thyroid: No thyromegaly  Cardiovascular:      Rate and Rhythm: Normal rate and regular rhythm  Heart sounds: S1 normal and S2 normal  No murmur  No friction rub  Pulmonary:      Effort: Pulmonary effort is normal  No tachypnea or respiratory distress  Breath sounds: Normal breath sounds  No wheezing, rhonchi or rales  Musculoskeletal:         General: No tenderness  Lymphadenopathy:      Head:      Right side of head: No submental or submandibular adenopathy  Left side of head: No submental or submandibular adenopathy  Cervical: No cervical adenopathy  Skin:     General: Skin is warm and dry  Findings: Erythema and lesion present  Nails: There is no clubbing  Comments: Genital lesions with surrounding erythema, tender to touch   Neurological:      Mental Status: She is alert  Sensory: No sensory deficit  Psychiatric:         Behavior: Behavior normal  Behavior is cooperative  Reviewed labs      Labs:   Office Visit on 08/17/2020   Component Date Value Ref Range Status    NIESHA 08/17/2020 Negative  Negative Final    CRP 08/17/2020 <3 0  <3 0 mg/L Final    Sed Rate 08/17/2020 13  0 - 19 mm/hour Final    ds DNA Ab 08/17/2020 <1  0 - 9 IU/mL Final                                       Negative      <5                                     Equivocal  5 - 9                                     Positive      >9   Clinical Support on 06/19/2020   Component Date Value Ref Range Status    TB Skin Test 06/22/2020 Negative   Final    Induration 06/22/2020 0  mm Final    Negative   Appointment on 06/19/2020   Component Date Value Ref Range Status    Cholesterol 06/19/2020 206* 50 - 200 mg/dL Final      Cholesterol:       Desirable         <200 mg/dl       Borderline         200-239 mg/dl       High              >239           Triglycerides 06/19/2020 125  <=150 mg/dL Final Triglyceride:     Normal          <150 mg/dl     Borderline High 150-199 mg/dl     High            200-499 mg/dl        Very High       >499 mg/dl    Specimen collection should occur prior to N-Acetylcysteine or Metamizole administration due to the potential for falsely depressed results   HDL, Direct 06/19/2020 58  >=40 mg/dL Final      HDL Cholesterol:       Low     <41 mg/dL  Specimen collection should occur prior to Metamizole administration due to the potential for falsley depressed results   LDL Calculated 06/19/2020 123* 0 - 100 mg/dL Final      LDL Cholesterol:     Optimal           <100 mg/dl     Near Optimal      100-129 mg/dl     Above Optimal       Borderline High 130-159 mg/dl       High            160-189 mg/dl       Very High       >189 mg/dl         This screening LDL is a calculated result  It does not have the accuracy of the Direct Measured LDL in the monitoring of patients with hyperlipidemia and/or statin therapy  Direct Measure LDL (WSN761) must be ordered separately in these patients   Non-HDL-Chol (CHOL-HDL) 06/19/2020 148  mg/dl Final    Sodium 06/19/2020 137  136 - 145 mmol/L Final    Potassium 06/19/2020 3 8  3 5 - 5 3 mmol/L Final    Chloride 06/19/2020 106  100 - 108 mmol/L Final    CO2 06/19/2020 23  21 - 32 mmol/L Final    ANION GAP 06/19/2020 8  4 - 13 mmol/L Final    BUN 06/19/2020 8  5 - 25 mg/dL Final    Creatinine 06/19/2020 0 80  0 60 - 1 30 mg/dL Final    Standardized to IDMS reference method    Glucose, Fasting 06/19/2020 84  65 - 99 mg/dL Final      Specimen collection should occur prior to Sulfasalazine administration due to the potential for falsely depressed results  Specimen collection should occur prior to Sulfapyridine administration due to the potential for falsely elevated results      Calcium 06/19/2020 9 2  8 3 - 10 1 mg/dL Final    AST 06/19/2020 12  5 - 45 U/L Final      Specimen collection should occur prior to Sulfasalazine administration due to the potential for falsely depressed results   ALT 06/19/2020 15  12 - 78 U/L Final      Specimen collection should occur prior to Sulfasalazine and/or Sulfapyridine administration due to the potential for falsely depressed results   Alkaline Phosphatase 06/19/2020 56  46 - 116 U/L Final    Total Protein 06/19/2020 7 3  6 4 - 8 2 g/dL Final    Albumin 06/19/2020 3 9  3 5 - 5 0 g/dL Final    Total Bilirubin 06/19/2020 0 48  0 20 - 1 00 mg/dL Final      Use of this assay is not recommended for patients undergoing treatment with eltrombopag due to the potential for falsely elevated results      eGFR 06/19/2020 106  ml/min/1 73sq m Final    WBC 06/19/2020 5 87  4 31 - 10 16 Thousand/uL Final    RBC 06/19/2020 4 61  3 81 - 5 12 Million/uL Final    Hemoglobin 06/19/2020 14 0  11 5 - 15 4 g/dL Final    Hematocrit 06/19/2020 43 1  34 8 - 46 1 % Final    MCV 06/19/2020 94  82 - 98 fL Final    MCH 06/19/2020 30 4  26 8 - 34 3 pg Final    MCHC 06/19/2020 32 5  31 4 - 37 4 g/dL Final    RDW 06/19/2020 12 9  11 6 - 15 1 % Final    MPV 06/19/2020 10 9  8 9 - 12 7 fL Final    Platelets 99/72/6415 279  149 - 390 Thousands/uL Final    nRBC 06/19/2020 0  /100 WBCs Final    Neutrophils Relative 06/19/2020 39* 43 - 75 % Final    Immat GRANS % 06/19/2020 0  0 - 2 % Final    Lymphocytes Relative 06/19/2020 50* 14 - 44 % Final    Monocytes Relative 06/19/2020 9  4 - 12 % Final    Eosinophils Relative 06/19/2020 1  0 - 6 % Final    Basophils Relative 06/19/2020 1  0 - 1 % Final    Neutrophils Absolute 06/19/2020 2 26  1 85 - 7 62 Thousands/µL Final    Immature Grans Absolute 06/19/2020 0 02  0 00 - 0 20 Thousand/uL Final    Lymphocytes Absolute 06/19/2020 2 96  0 60 - 4 47 Thousands/µL Final    Monocytes Absolute 06/19/2020 0 52  0 17 - 1 22 Thousand/µL Final    Eosinophils Absolute 06/19/2020 0 07  0 00 - 0 61 Thousand/µL Final    Basophils Absolute 06/19/2020 0 04  0 00 - 0 10 Thousands/µL Final  TSH 3RD GENERATON 06/19/2020 1 490  0 463 - 3 980 uIU/mL Final      The recommended reference ranges for TSH during pregnancy are as follows:   First trimester 0 1 to 2 5 uIU/mL   Second trimester  0 2 to 3 0 uIU/mL   Third trimester 0 3 to 3 0 uIU/m    Note: Normal ranges may not apply to patients who are transgender, non-binary, or whose legal sex, sex at birth, and gender identity differ  Using supplements with high doses of biotin 20 to more than 300 times greater than the adequate daily intake for adults of 30 mcg/day as established by the [employer], can cause falsely depress results      HIV-1/HIV-2 Ab 06/19/2020 Non-Reactive  Non-Reactive Final

## 2020-08-17 NOTE — PATIENT INSTRUCTIONS
Do labs, will give call with results  See Dermatology for possible skin biopsy  Try clobetasol cream nightly for 6-12 weeks    Return to clinic as needed    Lichen Sclerosus   AMBULATORY CARE:   Lichen sclerosus  is a skin condition that most often affects the vulva, penis, or skin around the anus  Lichen sclerosus occurs most often in females  The cause of lichen sclerosus may not be known  Common symptoms include the following: You may not have any symptoms, or you may have any of the following:  · Pain, itching, or burning    · Areas of skin that are thin, wrinkled, and white    · Blisters    · Bleeding, bruises, or tears on affected skin  Contact your healthcare provider if:   · Your symptoms do not get better with treatment  · You have questions or concerns about your condition or care  Treatment and management:  You do not need treatment if you do not have any symptoms  Your healthcare provider may recommend a steroid cream or ointment  Your provider may also recommend a topical medicine that prevents your immune system from attacking your skin  If you are female, your healthcare provider may recommend that you do not take bubble baths, or use scented soaps and scented detergents  This may help decrease irritation of the vulva  Rarely, adults may need surgery to repair scarring that can occur over time  Follow up with your healthcare provider as directed:  Write down your questions so you remember to ask them during your visits  © 2017 [employer] [employer] Information is for End User's use only and may not be sold, redistributed or otherwise used for commercial purposes  All illustrations and images included in CareNotes® are the copyrighted property of A D A M , Inc  or Neri Garcia  The above information is an  only  It is not intended as medical advice for individual conditions or treatments   Talk to your doctor, nurse or pharmacist before following any medical regimen to see if it is safe and effective for you

## 2020-08-18 LAB — DSDNA AB SER-ACNC: <1 IU/ML (ref 0–9)

## 2020-08-18 RX ORDER — CLOBETASOL PROPIONATE 0.5 MG/G
OINTMENT TOPICAL 2 TIMES DAILY
Qty: 60 G | Refills: 3 | Status: SHIPPED | OUTPATIENT
Start: 2020-08-18 | End: 2021-10-15 | Stop reason: ALTCHOICE

## 2020-08-19 LAB — RYE IGE QN: NEGATIVE

## 2020-11-25 ENCOUNTER — LAB (OUTPATIENT)
Dept: LAB | Facility: MEDICAL CENTER | Age: 20
End: 2020-11-25
Payer: COMMERCIAL

## 2020-11-25 ENCOUNTER — OFFICE VISIT (OUTPATIENT)
Dept: GASTROENTEROLOGY | Facility: MEDICAL CENTER | Age: 20
End: 2020-11-25
Attending: FAMILY MEDICINE
Payer: COMMERCIAL

## 2020-11-25 VITALS
WEIGHT: 116 LBS | TEMPERATURE: 97.5 F | DIASTOLIC BLOOD PRESSURE: 74 MMHG | SYSTOLIC BLOOD PRESSURE: 118 MMHG | BODY MASS INDEX: 21.35 KG/M2 | HEIGHT: 62 IN | HEART RATE: 76 BPM

## 2020-11-25 DIAGNOSIS — R11.0 NAUSEA: Primary | ICD-10-CM

## 2020-11-25 DIAGNOSIS — R11.0 NAUSEA: ICD-10-CM

## 2020-11-25 LAB — IGA SERPL-MCNC: 201 MG/DL (ref 70–400)

## 2020-11-25 PROCEDURE — 82784 ASSAY IGA/IGD/IGG/IGM EACH: CPT

## 2020-11-25 PROCEDURE — 83516 IMMUNOASSAY NONANTIBODY: CPT

## 2020-11-25 PROCEDURE — 99204 OFFICE O/P NEW MOD 45 MIN: CPT | Performed by: INTERNAL MEDICINE

## 2020-11-25 PROCEDURE — 36415 COLL VENOUS BLD VENIPUNCTURE: CPT

## 2020-11-25 RX ORDER — FAMOTIDINE 20 MG/1
20 TABLET, FILM COATED ORAL DAILY
Qty: 30 TABLET | Refills: 2 | Status: SHIPPED | OUTPATIENT
Start: 2020-11-25 | End: 2021-06-02 | Stop reason: ALTCHOICE

## 2020-11-27 LAB — TTG IGA SER-ACNC: <2 U/ML (ref 0–3)

## 2020-11-30 ENCOUNTER — TELEPHONE (OUTPATIENT)
Dept: GASTROENTEROLOGY | Facility: MEDICAL CENTER | Age: 20
End: 2020-11-30

## 2021-02-12 ENCOUNTER — TELEPHONE (OUTPATIENT)
Dept: GASTROENTEROLOGY | Facility: CLINIC | Age: 21
End: 2021-02-12

## 2021-02-12 DIAGNOSIS — R11.0 NAUSEA: Primary | ICD-10-CM

## 2021-02-12 RX ORDER — ONDANSETRON 4 MG/1
4 TABLET, FILM COATED ORAL EVERY 8 HOURS PRN
Qty: 20 TABLET | Refills: 0 | Status: SHIPPED | OUTPATIENT
Start: 2021-02-12 | End: 2021-10-15 | Stop reason: ALTCHOICE

## 2021-02-12 RX ORDER — PANTOPRAZOLE SODIUM 40 MG/1
40 TABLET, DELAYED RELEASE ORAL DAILY
Qty: 30 TABLET | Refills: 2 | Status: SHIPPED | OUTPATIENT
Start: 2021-02-12 | End: 2021-10-15 | Stop reason: ALTCHOICE

## 2021-02-12 NOTE — TELEPHONE ENCOUNTER
Patient hx- nausea, weight loss, bloated, heartburn, diarrhea    Patient call to report on going symptoms the increased x 1 week  Constant ausea and vomiting bile every morning  Denies any other sick symptoms/ afebrile  She is currently at [facility] in [place]  Taking pepcid 20 mg at bedtime  Appetite remains decreased / reported 17 lbs weight loss at last office visit  She will complete ordered stool testing and follow up at 2/25/21 office visit  Increase pepcid to twice daily? Zofran? Medications to be sent to [facility] in [place]

## 2021-02-12 NOTE — TELEPHONE ENCOUNTER
Patients GI provider:  Dr Yancy Traore    Number to return call: [phone]    Reason for call: Pt calling stating she is continuously nauseous and it is making her vomit now   Pt would like to speak to someone    Scheduled procedure/appointment date if applicable: Appt - 56/32/03

## 2021-02-12 NOTE — TELEPHONE ENCOUNTER
I recommend trying something stronger like a protonix  I will send this and zofran for her to try  She should schedule an appointment when she will be home from school for us to discuss testing and evaluation or she could see someone in [facility] while she is there

## 2021-02-12 NOTE — TELEPHONE ENCOUNTER
Lab slip for h pylori stool testing e mailed as requested [email or web address]  Pantoprazole and zofran scripts sent to pharmacy  She is aware to submit stool for h pylor prior to starting pantoprazole  Discuss further at follow up visit 2/25/21

## 2021-04-02 ENCOUNTER — APPOINTMENT (OUTPATIENT)
Dept: LAB | Facility: MEDICAL CENTER | Age: 21
End: 2021-04-02
Payer: COMMERCIAL

## 2021-04-02 ENCOUNTER — TRANSCRIBE ORDERS (OUTPATIENT)
Dept: ADMINISTRATIVE | Facility: HOSPITAL | Age: 21
End: 2021-04-02

## 2021-04-02 DIAGNOSIS — Z79.899 ENCOUNTER FOR LONG-TERM (CURRENT) USE OF OTHER MEDICATIONS: ICD-10-CM

## 2021-04-02 DIAGNOSIS — L70.0 ACNE VULGARIS: Primary | ICD-10-CM

## 2021-04-02 DIAGNOSIS — L70.0 ACNE VULGARIS: ICD-10-CM

## 2021-04-02 LAB — POTASSIUM SERPL-SCNC: 4.2 MMOL/L (ref 3.5–5.3)

## 2021-04-02 PROCEDURE — 36415 COLL VENOUS BLD VENIPUNCTURE: CPT

## 2021-04-02 PROCEDURE — 84132 ASSAY OF SERUM POTASSIUM: CPT

## 2021-05-24 ENCOUNTER — TELEPHONE (OUTPATIENT)
Dept: GASTROENTEROLOGY | Facility: CLINIC | Age: 21
End: 2021-05-24

## 2021-05-24 NOTE — TELEPHONE ENCOUNTER
Patients GI provider:  Dr Cheatham Settler    Number to return call: [phone]    Reason for call: Pt called stating she received a bill for stool sample from March but never received the results because she was told GI did not have the results, Pt would like for us to call the lab to request result and to give her a call back      Scheduled procedure/appointment date if applicable: N/A

## 2021-05-25 NOTE — TELEPHONE ENCOUNTER
We have not received stool study results  Called [facility], they have H  Pylori report and will fax the results to us

## 2021-05-26 ENCOUNTER — CLINICAL SUPPORT (OUTPATIENT)
Dept: FAMILY MEDICINE CLINIC | Facility: CLINIC | Age: 21
End: 2021-05-26
Payer: COMMERCIAL

## 2021-05-26 VITALS — TEMPERATURE: 97.8 F

## 2021-05-26 DIAGNOSIS — Z23 NEED FOR TDAP VACCINATION: ICD-10-CM

## 2021-05-26 DIAGNOSIS — Z11.1 PPD SCREENING TEST: Primary | ICD-10-CM

## 2021-05-26 PROCEDURE — 90715 TDAP VACCINE 7 YRS/> IM: CPT

## 2021-05-26 PROCEDURE — 90471 IMMUNIZATION ADMIN: CPT

## 2021-05-26 PROCEDURE — 86580 TB INTRADERMAL TEST: CPT

## 2021-05-26 NOTE — TELEPHONE ENCOUNTER
Results are scanned into chart  Called patient and left her a message making her aware of negative H  Pylori results

## 2021-05-28 ENCOUNTER — CLINICAL SUPPORT (OUTPATIENT)
Dept: FAMILY MEDICINE CLINIC | Facility: CLINIC | Age: 21
End: 2021-05-28

## 2021-05-28 DIAGNOSIS — Z11.1 ENCOUNTER FOR PPD SKIN TEST READING: Primary | ICD-10-CM

## 2021-05-28 LAB
INDURATION: 0 MM
TB SKIN TEST: NEGATIVE

## 2021-06-02 ENCOUNTER — OFFICE VISIT (OUTPATIENT)
Dept: FAMILY MEDICINE CLINIC | Facility: CLINIC | Age: 21
End: 2021-06-02
Payer: COMMERCIAL

## 2021-06-02 ENCOUNTER — TELEPHONE (OUTPATIENT)
Dept: FAMILY MEDICINE CLINIC | Facility: CLINIC | Age: 21
End: 2021-06-02

## 2021-06-02 ENCOUNTER — TELEPHONE (OUTPATIENT)
Dept: NEUROLOGY | Facility: CLINIC | Age: 21
End: 2021-06-02

## 2021-06-02 VITALS
HEART RATE: 76 BPM | DIASTOLIC BLOOD PRESSURE: 72 MMHG | OXYGEN SATURATION: 100 % | TEMPERATURE: 98.9 F | BODY MASS INDEX: 20.83 KG/M2 | HEIGHT: 62 IN | WEIGHT: 113.2 LBS | SYSTOLIC BLOOD PRESSURE: 108 MMHG | RESPIRATION RATE: 18 BRPM

## 2021-06-02 DIAGNOSIS — F12.90 MARIJUANA USE: ICD-10-CM

## 2021-06-02 DIAGNOSIS — R55 SYNCOPE, UNSPECIFIED SYNCOPE TYPE: Primary | ICD-10-CM

## 2021-06-02 PROCEDURE — 93000 ELECTROCARDIOGRAM COMPLETE: CPT | Performed by: FAMILY MEDICINE

## 2021-06-02 PROCEDURE — 99214 OFFICE O/P EST MOD 30 MIN: CPT | Performed by: FAMILY MEDICINE

## 2021-06-02 PROCEDURE — 3008F BODY MASS INDEX DOCD: CPT | Performed by: FAMILY MEDICINE

## 2021-06-02 PROCEDURE — 1036F TOBACCO NON-USER: CPT | Performed by: FAMILY MEDICINE

## 2021-06-02 RX ORDER — SPIRONOLACTONE 50 MG/1
TABLET, FILM COATED ORAL
COMMUNITY
Start: 2021-04-06 | End: 2021-06-03 | Stop reason: SINTOL

## 2021-06-02 RX ORDER — ACETAMINOPHEN AND CODEINE PHOSPHATE 300; 30 MG/1; MG/1
1 TABLET ORAL EVERY 4 HOURS PRN
COMMUNITY
Start: 2021-04-01 | End: 2021-10-15 | Stop reason: ALTCHOICE

## 2021-06-02 RX ORDER — KETOCONAZOLE 20 MG/G
CREAM TOPICAL
COMMUNITY
End: 2021-10-15 | Stop reason: ALTCHOICE

## 2021-06-02 RX ORDER — CHLORHEXIDINE GLUCONATE 0.12 MG/ML
RINSE ORAL
COMMUNITY
Start: 2021-04-01 | End: 2021-10-15 | Stop reason: ALTCHOICE

## 2021-06-02 NOTE — TELEPHONE ENCOUNTER
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

## 2021-06-02 NOTE — TELEPHONE ENCOUNTER
Spoke to the very, very nice Ashleigh from [employer] office    Best contact number for patient:      confirmed    Emergency Contact name and number:    Referring provider and telephone number:     PCP    Primary Care Provider Name and if affiliated with [facility]:       [address] [facility] PCP     Reason for Appointment/Dx:     syncope    Have you seen and followed up with a pediatric Neurologist for this disease in the past?      NO, new onset   (If yes ok to schedule with Dr Jamas Cheadle)    Neurology Location patient would like to be seen:       ANYWHERE - will drive any location to be seen on Thursday or Friday (pt leaving to [facility]  after that)    Order received? Yes                                                 Records Received? PCP notes    Have you ever seen another Neurologist?       [address] [facility] Name:  Kwadwo Avery    ID/Policy #:    Secondary Insurance:    ID/Policy#: Workman's Comp/ Accident/ School  Information      Workman's Comp/Accident/School related?        NO    If yes name of Insurance company:    Claim #:    Date of Injury:    Type of Injury:     Name and Telephone Number:    Notes:                   Appointment date:     Thursday  6/3/21  3:30p  Fran HERNANDEZ packet

## 2021-06-02 NOTE — ASSESSMENT & PLAN NOTE
Pt has agreed to discontinue use as she has had 3 syncopal episodes occurring about 15 minutes after use

## 2021-06-02 NOTE — PROGRESS NOTES
Assessment/Plan:    She has had 3 syncopal episodes, each occurring about 15 minutes after smoking marijuana  She has agreed to immediate cessation of marijuana use  Reassured pt and her mom that EKG is normal and have given neuro referral for further w/u  Marijuana use  Pt has agreed to discontinue use as she has had 3 syncopal episodes occurring about 15 minutes after use  Diagnoses and all orders for this visit:    Syncope, unspecified syncope type  -     Ambulatory referral to Neurology; Future  -     POCT ECG    Marijuana use    Other orders  -     acetaminophen-codeine (TYLENOL #3) 300-30 mg per tablet; Take 1 tablet by mouth every 4 (four) hours as needed  -     chlorhexidine (PERIDEX) 0 12 % solution; RINSE WITH 1 CAP FULL FOR 60 SECONDS AND SPIT OUT TWICE DAILY  -     spironolactone (ALDACTONE) 50 mg tablet; TAKE 1 TABLET BY MOUTH EVERY DAY AT NIGHT  -     ketoconazole (NIZORAL) 2 % cream; ketoconazole 2 % topical cream   APPLY TO THE RASH TWICE A DAY X2 WEEKS          Subjective:      Patient ID: Maine Hawkins is a 23 y o  female  She is here with her mom with c/o episodes of "passing out"  2 years ago she had her first episode  She had not had anything to eat or drink  She was riding in a car, got dizzy then passed out  She awoke to her friend yelling her name  When she wakes up she is soaked in sweat and has headache and pins and needles in her hands  Two more recent episodes, also while riding in a car  She always has a warning ad feels Last week and yesterday  Bad feeling lasts for 1-2 hours afterwards  Yesterday she had episode of urinary incontinence during the episode    She felt like she pinched a nerve in her neck during the episode last week  Pain is mostly right neck  It was swollen but better now    She is a nursing student    She just ended yudy year and is supposed to return to [facility]  next week to work for the summer    All 3 episodes have occurred after smoking marijuana, about 10-15 min later  She smokes approx twice per week  Spironolactone was started May 20th by derm  She has been trying to stay hydrated, drinking plenty of water    She has hx of AM nausea, much better on Protonix      The following portions of the patient's history were reviewed and updated as appropriate: allergies, current medications, past family history, past medical history, past social history, past surgical history and problem list     Review of Systems   Constitutional: Negative for activity change, chills, fatigue and fever  HENT: Negative for congestion, ear pain, sinus pressure and sore throat  Eyes: Negative for pain and visual disturbance  Respiratory: Negative for cough, chest tightness, shortness of breath and wheezing  Cardiovascular: Negative for chest pain, palpitations and leg swelling  Gastrointestinal: Positive for nausea  Negative for abdominal pain, blood in stool, constipation, diarrhea and vomiting  Endocrine: Negative for polydipsia and polyuria  Genitourinary: Negative for difficulty urinating, dysuria, frequency and urgency  Musculoskeletal: Negative for arthralgias, joint swelling and myalgias  Skin: Negative for rash  Neurological: Positive for headaches  Negative for dizziness, weakness and numbness  Hematological: Negative for adenopathy  Does not bruise/bleed easily  Psychiatric/Behavioral: Negative for dysphoric mood  The patient is not nervous/anxious  Objective:      /72 (BP Location: Left arm, Patient Position: Sitting, Cuff Size: Adult)   Pulse 76   Temp 98 9 °F (37 2 °C) (Tympanic)   Resp 18   Ht 5' 2" (1 575 m)   Wt 51 3 kg (113 lb 3 2 oz)   LMP 05/26/2021   SpO2 100%   BMI 20 70 kg/m²          Physical Exam  Vitals signs and nursing note reviewed  Constitutional:       Appearance: Normal appearance  HENT:      Head: Normocephalic and atraumatic        Right Ear: Tympanic membrane, ear canal and external ear normal       Left Ear: Tympanic membrane, ear canal and external ear normal       Mouth/Throat:      Mouth: Mucous membranes are moist    Eyes:      Extraocular Movements: Extraocular movements intact  Pupils: Pupils are equal, round, and reactive to light  Neck:      Vascular: No carotid bruit  Cardiovascular:      Rate and Rhythm: Normal rate and regular rhythm  Pulses: Normal pulses  Heart sounds: Normal heart sounds  Pulmonary:      Effort: Pulmonary effort is normal       Breath sounds: Normal breath sounds  Abdominal:      General: There is no distension  Palpations: There is no mass  Tenderness: There is no abdominal tenderness  Musculoskeletal: Normal range of motion  Right lower leg: No edema  Left lower leg: No edema  Lymphadenopathy:      Cervical: No cervical adenopathy  Skin:     General: Skin is warm and dry  Neurological:      General: No focal deficit present  Mental Status: She is alert and oriented to person, place, and time  Cranial Nerves: No cranial nerve deficit  Motor: No weakness  Gait: Gait normal       Deep Tendon Reflexes: Reflexes normal    Psychiatric:         Mood and Affect: Mood normal          Behavior: Behavior normal          Thought Content:  Thought content normal

## 2021-06-02 NOTE — TELEPHONE ENCOUNTER
Patient was seen in the office today for Syncope, and was referred to Neurology  Dr Latoya Toro asked for me to give Neurology a call to see if we can get patient in to Neuro ASAP  I called [phone] and I was able to get the patient appointment for 6/3/21 @ 3:30pm With Dr Lukas Brown    [address] [place] [address], [facility]        Thank you!

## 2021-06-03 ENCOUNTER — CONSULT (OUTPATIENT)
Dept: NEUROLOGY | Facility: CLINIC | Age: 21
End: 2021-06-03
Payer: COMMERCIAL

## 2021-06-03 DIAGNOSIS — R55 SYNCOPE, UNSPECIFIED SYNCOPE TYPE: ICD-10-CM

## 2021-06-03 PROCEDURE — 99204 OFFICE O/P NEW MOD 45 MIN: CPT | Performed by: PSYCHIATRY & NEUROLOGY

## 2021-06-03 NOTE — PROGRESS NOTES
Patient ID: Francisco Valerio is a 23 y o  female  Assessment/Plan:    No problem-specific Assessment & Plan notes found for this encounter  {Assess/PlanSmartLinks:42812}       Subjective:    HPI    {[facility] HPI texts:79578}    {Common ambulatory SmartLinks:92877}         Objective:    Last menstrual period 05/26/2021  Physical Exam    Neurological Exam      ROS:    Review of Systems   Constitutional: Positive for unexpected weight change (weightloss)  Negative for appetite change and fever  HENT: Negative  Negative for hearing loss, tinnitus, trouble swallowing and voice change  Eyes: Negative  Negative for photophobia and pain  Respiratory: Negative  Negative for shortness of breath  Cardiovascular: Negative  Negative for palpitations  Gastrointestinal: Positive for nausea  Negative for vomiting  Endocrine: Negative  Negative for cold intolerance  Genitourinary: Negative  Negative for dysuria, frequency and urgency  Musculoskeletal: Positive for neck pain  Negative for myalgias  Skin: Negative  Negative for rash  Neurological: Positive for syncope, light-headedness, numbness (tingling ) and headaches  Negative for dizziness, tremors, seizures, facial asymmetry, speech difficulty and weakness  Convulsions    Hematological: Negative  Does not bruise/bleed easily  Psychiatric/Behavioral: Negative  Negative for confusion, hallucinations and sleep disturbance  Anxiety   All other systems reviewed and are negative

## 2021-10-15 ENCOUNTER — OFFICE VISIT (OUTPATIENT)
Dept: FAMILY MEDICINE CLINIC | Facility: CLINIC | Age: 21
End: 2021-10-15
Payer: COMMERCIAL

## 2021-10-15 VITALS
RESPIRATION RATE: 16 BRPM | DIASTOLIC BLOOD PRESSURE: 60 MMHG | HEIGHT: 62 IN | WEIGHT: 119 LBS | HEART RATE: 86 BPM | OXYGEN SATURATION: 98 % | SYSTOLIC BLOOD PRESSURE: 98 MMHG | BODY MASS INDEX: 21.9 KG/M2

## 2021-10-15 DIAGNOSIS — Z53.20 HIV SCREENING DECLINED: ICD-10-CM

## 2021-10-15 DIAGNOSIS — Z00.00 ANNUAL PHYSICAL EXAM: Primary | ICD-10-CM

## 2021-10-15 DIAGNOSIS — R53.83 FATIGUE, UNSPECIFIED TYPE: ICD-10-CM

## 2021-10-15 DIAGNOSIS — F32.A DEPRESSION, UNSPECIFIED DEPRESSION TYPE: ICD-10-CM

## 2021-10-15 DIAGNOSIS — Z11.59 NEED FOR HEPATITIS C SCREENING TEST: ICD-10-CM

## 2021-10-15 DIAGNOSIS — R11.0 CHRONIC NAUSEA: ICD-10-CM

## 2021-10-15 PROCEDURE — 3008F BODY MASS INDEX DOCD: CPT | Performed by: INTERNAL MEDICINE

## 2021-10-15 PROCEDURE — 99395 PREV VISIT EST AGE 18-39: CPT | Performed by: INTERNAL MEDICINE

## 2021-10-15 PROCEDURE — 3725F SCREEN DEPRESSION PERFORMED: CPT | Performed by: INTERNAL MEDICINE

## 2021-10-15 PROCEDURE — 1036F TOBACCO NON-USER: CPT | Performed by: INTERNAL MEDICINE

## 2021-10-15 RX ORDER — ESCITALOPRAM OXALATE 10 MG/1
10 TABLET ORAL DAILY
Qty: 90 TABLET | Refills: 0 | Status: SHIPPED | OUTPATIENT
Start: 2021-10-15 | End: 2021-11-18 | Stop reason: SDUPTHER

## 2021-10-16 ENCOUNTER — APPOINTMENT (OUTPATIENT)
Dept: LAB | Facility: MEDICAL CENTER | Age: 21
End: 2021-10-16
Payer: COMMERCIAL

## 2021-10-16 DIAGNOSIS — R53.83 FATIGUE, UNSPECIFIED TYPE: ICD-10-CM

## 2021-10-16 DIAGNOSIS — Z11.59 NEED FOR HEPATITIS C SCREENING TEST: ICD-10-CM

## 2021-10-16 DIAGNOSIS — Z53.20 HIV SCREENING DECLINED: ICD-10-CM

## 2021-10-16 LAB
25(OH)D3 SERPL-MCNC: 33.4 NG/ML (ref 30–100)
ALBUMIN SERPL BCP-MCNC: 4.3 G/DL (ref 3.5–5)
ALP SERPL-CCNC: 65 U/L (ref 46–116)
ALT SERPL W P-5'-P-CCNC: 11 U/L (ref 12–78)
ANION GAP SERPL CALCULATED.3IONS-SCNC: 7 MMOL/L (ref 4–13)
AST SERPL W P-5'-P-CCNC: 11 U/L (ref 5–45)
BASOPHILS # BLD AUTO: 0.07 THOUSANDS/ΜL (ref 0–0.1)
BASOPHILS NFR BLD AUTO: 1 % (ref 0–1)
BILIRUB SERPL-MCNC: 0.7 MG/DL (ref 0.2–1)
BUN SERPL-MCNC: 12 MG/DL (ref 5–25)
CALCIUM SERPL-MCNC: 9.9 MG/DL (ref 8.3–10.1)
CHLORIDE SERPL-SCNC: 107 MMOL/L (ref 100–108)
CHOLEST SERPL-MCNC: 245 MG/DL (ref 50–200)
CO2 SERPL-SCNC: 24 MMOL/L (ref 21–32)
CREAT SERPL-MCNC: 0.75 MG/DL (ref 0.6–1.3)
EOSINOPHIL # BLD AUTO: 0.03 THOUSAND/ΜL (ref 0–0.61)
EOSINOPHIL NFR BLD AUTO: 0 % (ref 0–6)
ERYTHROCYTE [DISTWIDTH] IN BLOOD BY AUTOMATED COUNT: 12.3 % (ref 11.6–15.1)
GFR SERPL CREATININE-BSD FRML MDRD: 114 ML/MIN/1.73SQ M
GLUCOSE P FAST SERPL-MCNC: 71 MG/DL (ref 65–99)
HCT VFR BLD AUTO: 43.7 % (ref 34.8–46.1)
HCV AB SER QL: NORMAL
HDLC SERPL-MCNC: 75 MG/DL
HGB BLD-MCNC: 14.4 G/DL (ref 11.5–15.4)
IMM GRANULOCYTES # BLD AUTO: 0.02 THOUSAND/UL (ref 0–0.2)
IMM GRANULOCYTES NFR BLD AUTO: 0 % (ref 0–2)
LDLC SERPL CALC-MCNC: 158 MG/DL (ref 0–100)
LYMPHOCYTES # BLD AUTO: 2.22 THOUSANDS/ΜL (ref 0.6–4.47)
LYMPHOCYTES NFR BLD AUTO: 29 % (ref 14–44)
MCH RBC QN AUTO: 30.6 PG (ref 26.8–34.3)
MCHC RBC AUTO-ENTMCNC: 33 G/DL (ref 31.4–37.4)
MCV RBC AUTO: 93 FL (ref 82–98)
MONOCYTES # BLD AUTO: 0.69 THOUSAND/ΜL (ref 0.17–1.22)
MONOCYTES NFR BLD AUTO: 9 % (ref 4–12)
NEUTROPHILS # BLD AUTO: 4.55 THOUSANDS/ΜL (ref 1.85–7.62)
NEUTS SEG NFR BLD AUTO: 61 % (ref 43–75)
NONHDLC SERPL-MCNC: 170 MG/DL
NRBC BLD AUTO-RTO: 0 /100 WBCS
PLATELET # BLD AUTO: 333 THOUSANDS/UL (ref 149–390)
PMV BLD AUTO: 10.8 FL (ref 8.9–12.7)
POTASSIUM SERPL-SCNC: 4 MMOL/L (ref 3.5–5.3)
PROT SERPL-MCNC: 8 G/DL (ref 6.4–8.2)
RBC # BLD AUTO: 4.7 MILLION/UL (ref 3.81–5.12)
SODIUM SERPL-SCNC: 138 MMOL/L (ref 136–145)
TRIGL SERPL-MCNC: 58 MG/DL
TSH SERPL DL<=0.05 MIU/L-ACNC: 0.67 UIU/ML (ref 0.36–3.74)
WBC # BLD AUTO: 7.58 THOUSAND/UL (ref 4.31–10.16)

## 2021-10-16 PROCEDURE — 87389 HIV-1 AG W/HIV-1&-2 AB AG IA: CPT

## 2021-10-16 PROCEDURE — 84443 ASSAY THYROID STIM HORMONE: CPT

## 2021-10-16 PROCEDURE — 85025 COMPLETE CBC W/AUTO DIFF WBC: CPT

## 2021-10-16 PROCEDURE — 80061 LIPID PANEL: CPT

## 2021-10-16 PROCEDURE — 80053 COMPREHEN METABOLIC PANEL: CPT

## 2021-10-16 PROCEDURE — 82306 VITAMIN D 25 HYDROXY: CPT

## 2021-10-16 PROCEDURE — 86803 HEPATITIS C AB TEST: CPT

## 2021-10-16 PROCEDURE — 36415 COLL VENOUS BLD VENIPUNCTURE: CPT

## 2021-10-20 LAB — HIV 1+2 AB+HIV1 P24 AG SERPL QL IA: NORMAL

## 2021-11-18 ENCOUNTER — TELEMEDICINE (OUTPATIENT)
Dept: FAMILY MEDICINE CLINIC | Facility: CLINIC | Age: 21
End: 2021-11-18
Payer: COMMERCIAL

## 2021-11-18 DIAGNOSIS — F32.A DEPRESSION, UNSPECIFIED DEPRESSION TYPE: ICD-10-CM

## 2021-11-18 PROCEDURE — 99213 OFFICE O/P EST LOW 20 MIN: CPT | Performed by: INTERNAL MEDICINE

## 2021-11-18 RX ORDER — ESCITALOPRAM OXALATE 10 MG/1
10 TABLET ORAL DAILY
Qty: 90 TABLET | Refills: 1 | Status: SHIPPED | OUTPATIENT
Start: 2021-11-18 | End: 2022-01-08

## 2021-11-30 ENCOUNTER — TELEPHONE (OUTPATIENT)
Dept: FAMILY MEDICINE CLINIC | Facility: CLINIC | Age: 21
End: 2021-11-30

## 2021-11-30 NOTE — TELEPHONE ENCOUNTER
Pt called and left a voice message stating she left a message for Dr Isamar Real with pics attached  She stated she noticed some bruising spots on her body, and wanted to know if she should go see someone at her [facility] health services

## 2021-12-17 ENCOUNTER — TELEPHONE (OUTPATIENT)
Dept: FAMILY MEDICINE CLINIC | Facility: CLINIC | Age: 21
End: 2021-12-17

## 2021-12-18 ENCOUNTER — NURSE TRIAGE (OUTPATIENT)
Dept: OTHER | Facility: OTHER | Age: 21
End: 2021-12-18

## 2021-12-18 DIAGNOSIS — R11.2 NON-INTRACTABLE VOMITING WITH NAUSEA, UNSPECIFIED VOMITING TYPE: Primary | ICD-10-CM

## 2021-12-20 RX ORDER — PROMETHAZINE HYDROCHLORIDE 12.5 MG/1
TABLET ORAL
Qty: 20 TABLET | Refills: 0 | Status: SHIPPED | OUTPATIENT
Start: 2021-12-20

## 2021-12-21 ENCOUNTER — OFFICE VISIT (OUTPATIENT)
Dept: FAMILY MEDICINE CLINIC | Facility: CLINIC | Age: 21
End: 2021-12-21
Payer: COMMERCIAL

## 2021-12-21 ENCOUNTER — LAB (OUTPATIENT)
Dept: LAB | Facility: MEDICAL CENTER | Age: 21
End: 2021-12-21
Payer: COMMERCIAL

## 2021-12-21 VITALS
OXYGEN SATURATION: 98 % | WEIGHT: 116.8 LBS | HEIGHT: 62 IN | TEMPERATURE: 98 F | BODY MASS INDEX: 21.49 KG/M2 | HEART RATE: 78 BPM | DIASTOLIC BLOOD PRESSURE: 70 MMHG | SYSTOLIC BLOOD PRESSURE: 100 MMHG

## 2021-12-21 DIAGNOSIS — E83.41 HYPERMAGNESEMIA: ICD-10-CM

## 2021-12-21 DIAGNOSIS — J98.2 PNEUMOMEDIASTINUM (HCC): ICD-10-CM

## 2021-12-21 DIAGNOSIS — D72.829 LEUKOCYTOSIS, UNSPECIFIED TYPE: ICD-10-CM

## 2021-12-21 DIAGNOSIS — R82.90 ABNORMAL URINE: ICD-10-CM

## 2021-12-21 DIAGNOSIS — R11.2 NON-INTRACTABLE VOMITING WITH NAUSEA, UNSPECIFIED VOMITING TYPE: Primary | ICD-10-CM

## 2021-12-21 DIAGNOSIS — R11.2 NON-INTRACTABLE VOMITING WITH NAUSEA, UNSPECIFIED VOMITING TYPE: ICD-10-CM

## 2021-12-21 LAB
ALBUMIN SERPL BCP-MCNC: 4.3 G/DL (ref 3.5–5)
ALP SERPL-CCNC: 63 U/L (ref 46–116)
ALT SERPL W P-5'-P-CCNC: 27 U/L (ref 12–78)
ANION GAP SERPL CALCULATED.3IONS-SCNC: 6 MMOL/L (ref 4–13)
AST SERPL W P-5'-P-CCNC: 20 U/L (ref 5–45)
BACTERIA UR QL AUTO: ABNORMAL /HPF
BASOPHILS # BLD AUTO: 0.09 THOUSANDS/ΜL (ref 0–0.1)
BASOPHILS NFR BLD AUTO: 1 % (ref 0–1)
BILIRUB SERPL-MCNC: 0.99 MG/DL (ref 0.2–1)
BILIRUB UR QL STRIP: ABNORMAL
BILIRUB UR QL STRIP: ABNORMAL
BUN SERPL-MCNC: 10 MG/DL (ref 5–25)
CALCIUM SERPL-MCNC: 9.4 MG/DL (ref 8.3–10.1)
CHLORIDE SERPL-SCNC: 105 MMOL/L (ref 100–108)
CLARITY UR: ABNORMAL
CLARITY UR: ABNORMAL
CO2 SERPL-SCNC: 26 MMOL/L (ref 21–32)
COLOR UR: ABNORMAL
COLOR UR: ABNORMAL
CREAT SERPL-MCNC: 0.77 MG/DL (ref 0.6–1.3)
EOSINOPHIL # BLD AUTO: 0.07 THOUSAND/ΜL (ref 0–0.61)
EOSINOPHIL NFR BLD AUTO: 1 % (ref 0–6)
ERYTHROCYTE [DISTWIDTH] IN BLOOD BY AUTOMATED COUNT: 12.7 % (ref 11.6–15.1)
GFR SERPL CREATININE-BSD FRML MDRD: 109 ML/MIN/1.73SQ M
GLUCOSE P FAST SERPL-MCNC: 104 MG/DL (ref 65–99)
GLUCOSE UR STRIP-MCNC: NEGATIVE MG/DL
GLUCOSE UR STRIP-MCNC: NEGATIVE MG/DL
HCT VFR BLD AUTO: 43.5 % (ref 34.8–46.1)
HGB BLD-MCNC: 14.6 G/DL (ref 11.5–15.4)
HGB UR QL STRIP.AUTO: NEGATIVE
HGB UR QL STRIP.AUTO: NEGATIVE
IMM GRANULOCYTES # BLD AUTO: 0.02 THOUSAND/UL (ref 0–0.2)
IMM GRANULOCYTES NFR BLD AUTO: 0 % (ref 0–2)
KETONES UR STRIP-MCNC: NEGATIVE MG/DL
KETONES UR STRIP-MCNC: NEGATIVE MG/DL
LEUKOCYTE ESTERASE UR QL STRIP: ABNORMAL
LEUKOCYTE ESTERASE UR QL STRIP: ABNORMAL
LYMPHOCYTES # BLD AUTO: 2.73 THOUSANDS/ΜL (ref 0.6–4.47)
LYMPHOCYTES NFR BLD AUTO: 35 % (ref 14–44)
MAGNESIUM SERPL-MCNC: 2.3 MG/DL (ref 1.6–2.6)
MCH RBC QN AUTO: 30.4 PG (ref 26.8–34.3)
MCHC RBC AUTO-ENTMCNC: 33.6 G/DL (ref 31.4–37.4)
MCV RBC AUTO: 90 FL (ref 82–98)
MONOCYTES # BLD AUTO: 0.53 THOUSAND/ΜL (ref 0.17–1.22)
MONOCYTES NFR BLD AUTO: 7 % (ref 4–12)
NEUTROPHILS # BLD AUTO: 4.32 THOUSANDS/ΜL (ref 1.85–7.62)
NEUTS SEG NFR BLD AUTO: 56 % (ref 43–75)
NITRITE UR QL STRIP: NEGATIVE
NITRITE UR QL STRIP: NEGATIVE
NON-SQ EPI CELLS URNS QL MICRO: ABNORMAL /HPF
NRBC BLD AUTO-RTO: 0 /100 WBCS
PH UR STRIP.AUTO: 6 [PH]
PH UR STRIP.AUTO: 6 [PH]
PLATELET # BLD AUTO: 322 THOUSANDS/UL (ref 149–390)
PMV BLD AUTO: 10.4 FL (ref 8.9–12.7)
POTASSIUM SERPL-SCNC: 3.9 MMOL/L (ref 3.5–5.3)
PROT SERPL-MCNC: 7.6 G/DL (ref 6.4–8.2)
PROT UR STRIP-MCNC: ABNORMAL MG/DL
PROT UR STRIP-MCNC: ABNORMAL MG/DL
RBC # BLD AUTO: 4.81 MILLION/UL (ref 3.81–5.12)
RBC #/AREA URNS AUTO: ABNORMAL /HPF
SODIUM SERPL-SCNC: 137 MMOL/L (ref 136–145)
SP GR UR STRIP.AUTO: 1.02 (ref 1–1.03)
SP GR UR STRIP.AUTO: 1.02 (ref 1–1.03)
UROBILINOGEN UR QL STRIP.AUTO: 2 E.U./DL
UROBILINOGEN UR QL STRIP.AUTO: 2 E.U./DL
WBC # BLD AUTO: 7.76 THOUSAND/UL (ref 4.31–10.16)
WBC #/AREA URNS AUTO: ABNORMAL /HPF

## 2021-12-21 PROCEDURE — 82785 ASSAY OF IGE: CPT

## 2021-12-21 PROCEDURE — 99214 OFFICE O/P EST MOD 30 MIN: CPT | Performed by: FAMILY MEDICINE

## 2021-12-21 PROCEDURE — 86003 ALLG SPEC IGE CRUDE XTRC EA: CPT

## 2021-12-21 PROCEDURE — 83735 ASSAY OF MAGNESIUM: CPT

## 2021-12-21 PROCEDURE — 81001 URINALYSIS AUTO W/SCOPE: CPT | Performed by: FAMILY MEDICINE

## 2021-12-21 PROCEDURE — 85025 COMPLETE CBC W/AUTO DIFF WBC: CPT

## 2021-12-21 PROCEDURE — 80053 COMPREHEN METABOLIC PANEL: CPT

## 2021-12-21 PROCEDURE — 36415 COLL VENOUS BLD VENIPUNCTURE: CPT

## 2021-12-21 PROCEDURE — 1036F TOBACCO NON-USER: CPT | Performed by: FAMILY MEDICINE

## 2021-12-21 PROCEDURE — 3008F BODY MASS INDEX DOCD: CPT | Performed by: FAMILY MEDICINE

## 2021-12-21 RX ORDER — CLINDAMYCIN PHOSPHATE 10 UG/ML
LOTION TOPICAL
COMMUNITY
Start: 2021-10-15

## 2021-12-22 LAB
ALLERGEN COMMENT: NORMAL
ALMOND IGE QN: <0.1 KUA/I
CASHEW NUT IGE QN: <0.1 KUA/I
CODFISH IGE QN: <0.1 KUA/I
EGG WHITE IGE QN: <0.1 KUA/I
GLUTEN IGE QN: <0.1 KUA/I
HAZELNUT IGE QN: <0.1 KUA/L
MILK IGE QN: <0.1 KUA/I
PEANUT IGE QN: <0.1 KUA/I
SALMON IGE QN: <0.1 KUA/I
SCALLOP IGE QN: <0.1 KUA/L
SESAME SEED IGE QN: <0.1 KUA/I
SHRIMP IGE QN: <0.1 KUA/L
SOYBEAN IGE QN: <0.1 KUA/I
TOTAL IGE SMQN RAST: 43.5 KU/L (ref 0–113)
TUNA IGE QN: <0.1 KUA/I
WALNUT IGE QN: <0.1 KUA/I
WHEAT IGE QN: <0.1 KUA/I

## 2022-01-03 ENCOUNTER — TELEPHONE (OUTPATIENT)
Dept: FAMILY MEDICINE CLINIC | Facility: CLINIC | Age: 22
End: 2022-01-03

## 2022-01-03 DIAGNOSIS — Z78.9 USES BIRTH CONTROL: Primary | ICD-10-CM

## 2022-01-03 RX ORDER — NORETHINDRONE ACETATE AND ETHINYL ESTRADIOL 1MG-20(21)
1 KIT ORAL DAILY
Qty: 28 TABLET | Refills: 5 | Status: SHIPPED | OUTPATIENT
Start: 2022-01-03 | End: 2022-06-16 | Stop reason: SDUPTHER

## 2022-01-03 NOTE — TELEPHONE ENCOUNTER
PT CALLED REQUESTING AN RX FOR HER BIRTH CONTROL BE SENT TO [facility] RX NAME Imelda Noonan SE 1-20 SINCE SHE IS LOOKING FOR NEW GYN

## 2022-01-08 DIAGNOSIS — F32.A DEPRESSION, UNSPECIFIED DEPRESSION TYPE: ICD-10-CM

## 2022-01-08 RX ORDER — ESCITALOPRAM OXALATE 10 MG/1
TABLET ORAL
Qty: 30 TABLET | Refills: 2 | Status: SHIPPED | OUTPATIENT
Start: 2022-01-08 | End: 2022-08-03 | Stop reason: SDUPTHER

## 2022-05-31 ENCOUNTER — TELEPHONE (OUTPATIENT)
Dept: FAMILY MEDICINE CLINIC | Facility: CLINIC | Age: 22
End: 2022-05-31

## 2022-05-31 NOTE — TELEPHONE ENCOUNTER
Pt called back and I have scheduled her for a virtual with Dr Harman Valentine today to discuss details and questioned mention below

## 2022-05-31 NOTE — TELEPHONE ENCOUNTER
PT IS ON HER 3RD DAY OF SYMPTOMS, SHE HAS MEDICAL QUESTIONS      PT IS ALSO STATES SHE PLANNED VACATION June 18TH AND WANTS TO KNOW WOULD SHE STILL BE ABLE TO TRAVEL?    [phone]

## 2022-06-16 DIAGNOSIS — Z78.9 USES BIRTH CONTROL: ICD-10-CM

## 2022-06-16 RX ORDER — NORETHINDRONE ACETATE AND ETHINYL ESTRADIOL 1MG-20(21)
1 KIT ORAL DAILY
Qty: 28 TABLET | Refills: 5 | Status: SHIPPED | OUTPATIENT
Start: 2022-06-16

## 2022-08-03 DIAGNOSIS — F32.A DEPRESSION, UNSPECIFIED DEPRESSION TYPE: ICD-10-CM

## 2022-08-03 RX ORDER — ESCITALOPRAM OXALATE 10 MG/1
10 TABLET ORAL DAILY
Qty: 30 TABLET | Refills: 0 | Status: SHIPPED | OUTPATIENT
Start: 2022-08-03

## 2022-12-05 DIAGNOSIS — Z78.9 USES BIRTH CONTROL: ICD-10-CM

## 2022-12-05 RX ORDER — NORETHINDRONE ACETATE AND ETHINYL ESTRADIOL 1MG-20(21)
1 KIT ORAL DAILY
Qty: 28 TABLET | Refills: 5 | Status: SHIPPED | OUTPATIENT
Start: 2022-12-05

## 2023-04-28 NOTE — TELEPHONE ENCOUNTER
Pt is in the state of [place], she is out of *Escitalopram (Lexapro) 10 mg tablet       She would like you to call into [facility] [address] [facility], [address] [facility]   Ph  [phone]    Please advise
ambulatory

## 2023-11-28 NOTE — PROGRESS NOTES
[place] [address] [facility] [address] [facility]  Initial Consultation    Impression/Plan    Ms Margaret Fontana is a 23 y o  female with syncope  Prodrome and last of postictal state support syncope rather than seizure  All events have been provoked by marijuana  Spironolactone may have contributed to recent events  Discussed that remaining in the upright position prolonged the events by extending the period of cerebral hypoperfusion and is likely the reason the events progressed to convulsion  She should be lowered to the floor if another event occurs  We discussed the pathophysiology of syncope versus seizure safety/precautions  No additional neurological workup indicated at this point  Consider evaluation by cardiology if events recur  Patient Instructions   1  Stop spironolactone, no marijuana, stay well hydrated  2  Return as needed  Diagnoses and all orders for this visit:    Syncope, unspecified syncope type  -     Ambulatory referral to Neurology        Jeb    Lizz Carbajal is a 23 y o  right handed female presenting to the [place] [address] [facility] for evaluation of syncope  She arrives with her mother  There first event was in 2019  10 min after marijuana pen she lost consciousness  Vision abnormal with things moving, nausea feelign in throat, closes eyes, said she doesn't feel well  Tried to take deep breaths  Feels like anxiety attack  She passed out and leaned to the side  her friend supported her to keep her upright in the chair  Per witness her arms came up, muscle shaking, eyes rolled back  Felt sick after, like she drank too much, severe diaphoresis  Friend said she leaned to the side and friend held her up and tried to wake her up for 1-2 minutes  She recalls waking up in the seated position and was immediately able to interact, there was no clear postictal confusion  Hadn't eaten much that day  Event was near noon  Hadn't had much to drink       There were 2 additional events that occurred recently, essentially the same as the first  Both occurred shortly after marijuana and while sitting upright  Her same friend was with her for all three events and kept her in an upright position  There was apparent convulsion while sitting upright and urinary incontinence with one event  There was no postictal confusion  The second event was on 5/26 at 9 pm  The most recent event was 6/1 at 5:30 pm  Had used marijuana about 15 minutes prior, one puff  Hadn't eaten much that day  Felt really hot and then alternating cold  Was on spironolactone from dermatology since 5/20  Migraine headaches in high school, but not recently  No other events of passing out  No history of seizures  Current AEDs:  none    Event/Seizure semiology:  Presyncope followed by syncope with convulsion while sitting upright with no postictal state  All events occurred within 15 minutes of marijuana use  Special Features  Status epilepticus: no  Self Injury Seizures: neck injury  Precipitating Factors: marijuana    Epilepsy Risk Factors:  Normal birth and early development  No history of febrile seizures  No history of CNS infection  No family history of epilepsy  No significant head injury  Prior AEDs:  None    Prior Evaluation:  Mri brain normal 6/15/2015 (indication daily headaches)    History Reviewed: The following were reviewed and updated as appropriate: allergies, current medications, past family history, past medical history, past social history, past surgical history and problem list     Psychiatric History:  none    ROS  Constitutional: Positive for unexpected weight change (weightloss)  Negative for appetite change and fever  HENT: Negative  Negative for hearing loss, tinnitus, trouble swallowing and voice change  Eyes: Negative  Negative for photophobia and pain  Respiratory: Negative  Negative for shortness of breath  Cardiovascular: Negative  Negative for palpitations  Gastrointestinal: Positive for nausea  Negative for vomiting  Endocrine: Negative  Negative for cold intolerance  Genitourinary: Negative  Negative for dysuria, frequency and urgency  Musculoskeletal: Positive for neck pain  Negative for myalgias  Skin: Negative  Negative for rash  Neurological: Positive for syncope, light-headedness, numbness (tingling ) and headaches  Negative for dizziness, tremors, seizures, facial asymmetry, speech difficulty and weakness  Convulsions    Hematological: Negative  Does not bruise/bleed easily  Psychiatric/Behavioral: Negative  Negative for confusion, hallucinations and sleep disturbance  Anxiety   All other systems reviewed and are negative  Objective    LMP 05/26/2021      General Exam  General: well developed, no acute distress  HEENT: mucous membranes moist, anicteric sclera  Extremities: no clubbing, cyanosis or edema  Skin: no rash on visible skin  Neurological Exam  Mental Status: awake, alert, and fully oriented to person, place, time, and situation  Attention and memory intact  Fund of knowledge is appropriate for age and education  There is no neglect  Language: fluency, naming, comprehension, and repetition normal        Cranial Nerves: Pupils equal and reactive to light  Visual fields full to confrontation  Extraocular motions intact with full versions, normal pursuits and saccades  Facial strength full and symmetric  Facial sensation intact in V1-V3  Hearing intact to finger rub bilaterally  Tongue protrudes to midline  Palate elevates symmetrically  Speech clear without notable dysarthria  Shoulder shrug activation full and symmetric  Motor: Normal bulk and tone  No pronator drift  Strength is 5/5 proximally and distally in all 4 extremities  No involuntary movements  Sensory: Sensation intact to light touch distally in all extremities  Coordination: Normal finger-to-nose       Station and gait: Casual and tandem gait normal  Normal Romberg      Reflexes: Reflexes normal throughout and symmetric (biceps, patella, achilles)        Jesse Lewis MD   [address] [facility]  [facility] [place] [address] [facility] no